# Patient Record
Sex: FEMALE | Race: BLACK OR AFRICAN AMERICAN | Employment: FULL TIME | URBAN - METROPOLITAN AREA
[De-identification: names, ages, dates, MRNs, and addresses within clinical notes are randomized per-mention and may not be internally consistent; named-entity substitution may affect disease eponyms.]

---

## 2017-04-20 ENCOUNTER — TELEPHONE (OUTPATIENT)
Dept: CARDIOLOGY CLINIC | Age: 57
End: 2017-04-20

## 2017-04-20 NOTE — TELEPHONE ENCOUNTER
[facility] for courtesy 4 wk reminder, pt has appointment w/ Dr. Silvestre Bartlett Monday May 15 at 8:30.

## 2017-05-15 ENCOUNTER — OFFICE VISIT (OUTPATIENT)
Dept: CARDIOLOGY CLINIC | Age: 57
End: 2017-05-15

## 2017-05-15 VITALS
WEIGHT: 215 LBS | OXYGEN SATURATION: 98 % | HEIGHT: 66 IN | HEART RATE: 78 BPM | SYSTOLIC BLOOD PRESSURE: 123 MMHG | DIASTOLIC BLOOD PRESSURE: 82 MMHG | BODY MASS INDEX: 34.55 KG/M2

## 2017-05-15 DIAGNOSIS — E78.5 DYSLIPIDEMIA: ICD-10-CM

## 2017-05-15 DIAGNOSIS — R06.09 DOE (DYSPNEA ON EXERTION): ICD-10-CM

## 2017-05-15 DIAGNOSIS — I49.3 PVCS (PREMATURE VENTRICULAR CONTRACTIONS): ICD-10-CM

## 2017-05-15 DIAGNOSIS — R00.2 PALPITATIONS: ICD-10-CM

## 2017-05-15 DIAGNOSIS — I10 ESSENTIAL HYPERTENSION: Chronic | ICD-10-CM

## 2017-05-15 DIAGNOSIS — R06.00 DYSPNEA, UNSPECIFIED TYPE: Primary | ICD-10-CM

## 2017-05-15 DIAGNOSIS — I47.1 SVT (SUPRAVENTRICULAR TACHYCARDIA) (HCC): ICD-10-CM

## 2017-05-15 RX ORDER — BUDESONIDE AND FORMOTEROL FUMARATE DIHYDRATE 160; 4.5 UG/1; UG/1
2 AEROSOL RESPIRATORY (INHALATION) 2 TIMES DAILY
COMMUNITY

## 2017-05-15 NOTE — PROGRESS NOTES
1. Have you been to the ER, urgent care clinic since your last visit? Hospitalized since your last visit? No    2. Have you seen or consulted any other health care providers outside of the [address] [place] [address] since your last visit? Include any pap smears or colon screening.  No

## 2017-05-15 NOTE — MR AVS SNAPSHOT
Visit Information Date & Time Provider Department Dept. Phone Encounter #  
 5/15/2017  8:30 AM Sanjana Tristan MD [facility] [phone] [id number] Follow-up Instructions Return in about 3 weeks (around 6/5/2017). Upcoming Health Maintenance Date Due DTaP/Tdap/Td series (1 - Tdap) 2/22/1979 PAP AKA CERVICAL CYTOLOGY 2/22/1979 FOBT Q 1 YEAR AGE 50-75 10/20/2016 INFLUENZA AGE 9 TO ADULT 8/1/2017 BREAST CANCER SCRN MAMMOGRAM 10/24/2018 Allergies as of 5/15/2017  Review Complete On: 5/15/2017 By: Ulises Maurice RN Severity Noted Reaction Type Reactions Latex  01/25/2014    Unknown (comments) Aspirin  01/25/2014    Unknown (comments) Peanut  02/27/2014    Swelling Current Immunizations  Never Reviewed No immunizations on file. Not reviewed this visit You Were Diagnosed With   
  
 Codes Comments Dyspnea, unspecified type    -  Primary ICD-10-CM: R06.00 
ICD-9-CM: 786.09 Vitals BP Pulse Height(growth percentile) Weight(growth percentile) SpO2 BMI  
 123/82 78 5' 6\" (1.676 m) 215 lb (97.5 kg) 98% 34.7 kg/m2 OB Status Smoking Status Hysterectomy Never Smoker BMI and BSA Data Body Mass Index Body Surface Area 34.7 kg/m 2 2.13 m 2 Preferred Pharmacy Pharmacy Name Phone [facility] [address] [place] [address] 32 Your Updated Medication List  
  
   
This list is accurate as of: 5/15/17  9:00 AM.  Always use your most recent med list. amLODIPine 10 mg tablet Commonly known as:  [place] Take  by mouth daily. calcitRIOL 0.5 mcg capsule Commonly known as:  ROCALTROL Take 1 mcg by mouth two (2) times a day. CO Q-10 100 mg capsule Generic drug:  co-enzyme Q-10 Take 100 mg by mouth daily. LIPITOR 20 mg tablet Generic drug:  atorvastatin Take  by mouth daily. metoprolol succinate 50 mg XL tablet Commonly known as:  TOPROL XL Take 0.5 Tabs by mouth daily. multivitamin tablet Commonly known as:  ONE A DAY Take 1 tablet by mouth daily. SYMBICORT 160-4.5 mcg/actuation HFA inhaler Generic drug:  budesonide-formoterol Take 2 Puffs by inhalation two (2) times a day. SYNTHROID 200 mcg tablet Generic drug:  levothyroxine Take  by mouth Daily (before breakfast). Follow-up Instructions Return in about 3 weeks (around 6/5/2017). Introducing [facility] SERVICES! Shun Moran introduces [employer] patient portal. Now you can access parts of your medical record, email your doctor's office, and request medication refills online. 1. In your internet browser, go to [email or web address] 2. Click on the First Time User? Click Here link in the Sign In box. You will see the New Member Sign Up page. 3. Enter your [employer] Access Code exactly as it appears below. You will not need to use this code after youve completed the sign-up process. If you do not sign up before the expiration date, you must request a new code. · [employer] Access Code: [email or web address] Expires: 8/13/2017  9:00 AM 
 
4. Enter the last four digits of your Social Security Number (xxxx) and Date of Birth (mm/dd/yyyy) as indicated and click Submit. You will be taken to the next sign-up page. 5. Create a [employer] ID. This will be your [employer] login ID and cannot be changed, so think of one that is secure and easy to remember. 6. Create a [employer] password. You can change your password at any time. 7. Enter your Password Reset Question and Answer. This can be used at a later time if you forget your password. 8. Enter your e-mail address. You will receive e-mail notification when new information is available in [address]. 9. Click Sign Up. You can now view and download portions of your medical record. 10. Click the Download Summary menu link to download a portable copy of your medical information. If you have questions, please visit the Frequently Asked Questions section of the [employer] website. Remember, [employer] is NOT to be used for urgent needs. For medical emergencies, dial 911. Now available from your iPhone and Android! Please provide this summary of care documentation to your next provider. Your primary care clinician is listed as Mukund Bhakta. If you have any questions after today's visit, please call [phone].

## 2017-05-15 NOTE — LETTER
Patient:  Jannetta Fabry YOB: 1960 Date of Visit: 5/15/2017 Dear Genny Gabriel MD 
[address] [address] VIA In Basket 
 : Thank you for referring Ms. Jannetta Fabry to me for evaluation/treatment. Below are the relevant portions of my assessment and plan of care. Subjective:  
   Jannetta Fabry is in the office today for cardiac reevaluation. She is a 64-year-old woman that has a history of hypertension, palpitations and SVT. In mid June of 2016, she was complaining of having occasional palpitations that were not particularly bothersome. She was started on atenolol at some point, but her medications were adjusted at the [facility] and her betablocker was left off. When she was initially seen on 10/03/2016, she was having more palpitations. She could feel them in her throat. She said that when her palpitations were the strongest, there was some associated lightheadedness. She was started on Toprol-XL 50 mg per day. With that, she was much less symptomatic. The patient had a nuclear stress test done recently that showed no evidence of ischemia. In the office today, she says she is still having occasional palpitations, but she does not normally notice them until the end of the day. Recently, she has had an episode of bronchitis and is on Symbicort. She states that she has been experiencing possibly more dyspnea on exertion. She generally has limiting dyspnea with walking up one flight of steps. She has had no PND or orthopnea. She has had no near syncope or syncope. .   
 
 
Patient Active Problem List  
 Diagnosis Date Noted  FERNANDES (dyspnea on exertion) 05/22/2017  Palpitations 11/20/2016  BMI 35.0-35.9,adult 10/20/2015  Vitamin D deficiency 10/20/2015  Hypocalcemia 10/20/2015  Postoperative hypothyroidism 10/20/2015  Allergic conjunctivitis of both eyes 05/11/2015  PVCs (premature ventricular contractions)  Dyslipidemia  S/P thyroidectomy 2014  S/P [facility]roidectomy ([facility] 2014  SVT (supraventricular tachycardia) ([facility] 2014  
 HTN (hypertension) 2014  Pain 2014 Current Outpatient Prescriptions Medication Sig Dispense Refill  budesonide-formoterol (SYMBICORT) 160-4.5 mcg/actuation HFA inhaler Take 2 Puffs by inhalation two (2) times a day.  calcitRIOL (ROCALTROL) 0.5 mcg capsule Take 1 mcg by mouth two (2) times a day.  levothyroxine (SYNTHROID) 200 mcg tablet Take  by mouth Daily (before breakfast).  co-enzyme Q-10 (CO Q-10) 100 mg capsule Take 100 mg by mouth daily.  amLODIPine (NORVASC) 10 mg tablet Take  by mouth daily.  atorvastatin (LIPITOR) 20 mg tablet Take  by mouth daily.  metoprolol succinate (TOPROL XL) 50 mg XL tablet Take 0.5 Tabs by mouth daily. 30 Tab 5  
 multivitamin (ONE A DAY) tablet Take 1 tablet by mouth daily. Allergies Allergen Reactions  Latex Unknown (comments)  Aspirin Unknown (comments)  Peanut Swelling Past Medical History:  
Diagnosis Date  Dry eye syndrome  Dyslipidemia  H/O hyperthyroidism   
 s/p thyroidectomy, Dr. Chichi Tran is managing  History of nuclear stress test 14 EF 72-73%,low risk, no wall motion or valvular abnormalities, normal EKG  Hypertension  PVCs (premature ventricular contractions) 2% of all beats, no VT (Holter 2014)  SVT (supraventricular tachycardia) ([facility]) Dr. Itz Pruitt, Cardiology Past Surgical History:  
Procedure Laterality Date [facility]  HX MOHS PROCEDURES  2009  
 right  HX THYROIDECTOMY   Dr. Walker Ray Family History Problem Relation Age of Onset  Hypertension Mother  Stroke Mother 72  
   History Smoking Status  Never Smoker Smokeless Tobacco  
 Never Used Review of Systems, additional: Constitutional: negative Eyes: negative Respiratory: recent episode of bronchitis Cardiovascular: positive for palpitations and FERNANDES Gastrointestinal: negative Musculoskeletal:negative Neurological: negative Behvioral/Psych: negative Endocrine: negative ENT: negative Objective:  
 
Visit Vitals  /82  Pulse 78  Ht 5' 6\" (1.676 m)  Wt 215 lb (97.5 kg)  SpO2 98%  BMI 34.7 kg/m2 General:  alert, cooperative, no distress, appears stated age Chest Wall: inspection normal - no chest wall deformities or tenderness, respiratory effort normal  
Lung: clear to auscultation bilaterally Heart:  normal rate, regular rhythm, normal S1, S2, no murmurs, rubs, clicks or gallops Abdomen: soft, non-tender. Bowel sounds normal. No masses,  no organomegaly Extremities: extremities normal, atraumatic, no cyanosis or edema Skin: no rashes Neuro: alert, oriented, normal speech, no focal findings or movement disorder noted Assessment/Plan: ICD-10-CM ICD-9-CM 1. Dyspnea, unspecified type R06.00 786.09 2D ECHO COMPLETE ADULT (TTE) W OR WO CONTR 2. Essential hypertension, controlled in office today. I10 401.9 3. SVT (supraventricular tachycardia) (HCC) I47.1 427.89 4. PVCs (premature ventricular contractions) I49.3 427.69   
5. Dyslipidemia E78.5 272.4 6. Palpitations, improved with addition of BB R00.2 785.1 7. FERNANDES (dyspnea on exertion), will order Echocardiogram to assess for diastolic dysfunction and consideration for low dose diuretics. RT 4 weeks. R06.09 786.09 If you have questions, please do not hesitate to call me. I look forward to following MsNirmala Chele Emelina along with you. Sincerely, Patricia Smith MD

## 2017-05-22 PROBLEM — R06.09 DOE (DYSPNEA ON EXERTION): Status: ACTIVE | Noted: 2017-05-22

## 2017-05-22 NOTE — PROGRESS NOTES
Subjective:      Radha Valenzuela is in the office today for cardiac reevaluation. She is a 66-year-old woman that has a history of hypertension, palpitations and SVT. In mid June of 2016, she was complaining of having occasional palpitations that were not particularly bothersome. She was started on atenolol at some point, but her medications were adjusted at the [facility] and her betablocker was left off. When she was initially seen on 10/03/2016, she was having more palpitations. She could feel them in her throat. She said that when her palpitations were the strongest, there was some associated lightheadedness. She was started on Toprol-XL 50 mg per day. With that, she was much less symptomatic. The patient had a nuclear stress test done recently that showed no evidence of ischemia. In the office today, she says she is still having occasional palpitations, but she does not normally notice them until the end of the day. Recently, she has had an episode of bronchitis and is on Symbicort. She states that she has been experiencing possibly more dyspnea on exertion. She generally has limiting dyspnea with walking up one flight of steps. She has had no PND or orthopnea. She has had no near syncope or syncope.                       .        Patient Active Problem List    Diagnosis Date Noted    FERNANDES (dyspnea on exertion) 05/22/2017    Palpitations 11/20/2016    BMI 35.0-35.9,adult 10/20/2015    Vitamin D deficiency 10/20/2015    Hypocalcemia 10/20/2015    Postoperative hypothyroidism 10/20/2015    Allergic conjunctivitis of both eyes 05/11/2015    PVCs (premature ventricular contractions)     Dyslipidemia     S/P thyroidectomy 03/27/2014    [facility] 03/27/2014    SVT (supraventricular tachycardia) ([facility] 03/27/2014    HTN (hypertension) 02/27/2014    Pain 02/27/2014     Current Outpatient Prescriptions   Medication Sig Dispense Refill    budesonide-formoterol (SYMBICORT) 160-4.5 mcg/actuation HFA inhaler Take 2 Puffs by inhalation two (2) times a day.  calcitRIOL (ROCALTROL) 0.5 mcg capsule Take 1 mcg by mouth two (2) times a day.  levothyroxine (SYNTHROID) 200 mcg tablet Take  by mouth Daily (before breakfast).  co-enzyme Q-10 (CO Q-10) 100 mg capsule Take 100 mg by mouth daily.  amLODIPine (NORVASC) 10 mg tablet Take  by mouth daily.  atorvastatin (LIPITOR) 20 mg tablet Take  by mouth daily.  metoprolol succinate (TOPROL XL) 50 mg XL tablet Take 0.5 Tabs by mouth daily. 30 Tab 5    multivitamin (ONE A DAY) tablet Take 1 tablet by mouth daily.        Allergies   Allergen Reactions    Latex Unknown (comments)    Aspirin Unknown (comments)    Peanut Swelling     Past Medical History:   Diagnosis Date    Dry eye syndrome     Dyslipidemia     H/O hyperthyroidism     s/p thyroidectomy, Dr. Amara Rubi is managing    History of nuclear stress test 14    EF 72-73%,low risk, no wall motion or valvular abnormalities, normal EKG    Hypertension     PVCs (premature ventricular contractions)     2% of all beats, no VT (Holter 2014)    SVT [facility]supraventricular tachycardia) ([facility])     Dr. Colon Medicus, Cardiology     Past Surgical History:   Procedure Laterality Date    HX HYSTERECTOMY      HX MOHS PROCEDURES  2009    right    [facility]      Dr. Russell Braxton     Family History   Problem Relation Age of Onset    Hypertension Mother     Stroke Mother 72          History   Smoking Status    Never Smoker   Smokeless Tobacco    Never Used          Review of Systems, additional:  Constitutional: negative  Eyes: negative  Respiratory: recent episode of bronchitis  Cardiovascular: positive for palpitations and FERNANDES  Gastrointestinal: negative  Musculoskeletal:negative  Neurological: negative  Behvioral/Psych: negative  Endocrine: negative  ENT: negative    Objective:     Visit Vitals    /82    Pulse 78    Ht 5' 6\" (1.676 m)    Wt 215 lb (97.5 kg)    SpO2 98%    BMI 34.7 kg/m2     General:  alert, cooperative, no distress, appears stated age   Chest Wall: inspection normal - no chest wall deformities or tenderness, respiratory effort normal   Lung: clear to auscultation bilaterally   Heart:  normal rate, regular rhythm, normal S1, S2, no murmurs, rubs, clicks or gallops   Abdomen: soft, non-tender. Bowel sounds normal. No masses,  no organomegaly   Extremities: extremities normal, atraumatic, no cyanosis or edema Skin: no rashes   Neuro: alert, oriented, normal speech, no focal findings or movement disorder noted         Assessment/Plan:       ICD-10-CM ICD-9-CM    1. Dyspnea, unspecified type R06.00 786.09 2D ECHO COMPLETE ADULT (TTE) W OR WO CONTR   2. Essential hypertension, controlled in office today. I10 401.9    3. SVT (supraventricular tachycardia) (HCC) I47.1 427.89    4. PVCs (premature ventricular contractions) I49.3 427.69    5. Dyslipidemia E78.5 272.4    6. Palpitations, improved with addition of BB R00.2 785.1    7. FERNANDES (dyspnea on exertion), will order Echocardiogram to assess for diastolic dysfunction and consideration for low dose diuretics. RT 4 weeks.  R06.09 786.09

## 2017-05-27 ENCOUNTER — HOSPITAL ENCOUNTER (OUTPATIENT)
Dept: NON INVASIVE DIAGNOSTICS | Age: 57
Discharge: HOME | End: 2017-05-27
Attending: INTERNAL MEDICINE
Payer: COMMERCIAL

## 2017-05-27 DIAGNOSIS — R06.00 DYSPNEA, UNSPECIFIED TYPE: ICD-10-CM

## 2017-05-27 PROCEDURE — 93306 TTE W/DOPPLER COMPLETE: CPT

## 2017-06-05 ENCOUNTER — OFFICE VISIT (OUTPATIENT)
Dept: CARDIOLOGY CLINIC | Age: 57
End: 2017-06-05

## 2017-06-05 VITALS
HEART RATE: 89 BPM | HEIGHT: 66 IN | OXYGEN SATURATION: 98 % | BODY MASS INDEX: 34.55 KG/M2 | WEIGHT: 215 LBS | DIASTOLIC BLOOD PRESSURE: 79 MMHG | SYSTOLIC BLOOD PRESSURE: 120 MMHG

## 2017-06-05 DIAGNOSIS — I49.3 PVCS (PREMATURE VENTRICULAR CONTRACTIONS): ICD-10-CM

## 2017-06-05 DIAGNOSIS — R06.09 DOE (DYSPNEA ON EXERTION): ICD-10-CM

## 2017-06-05 DIAGNOSIS — I10 ESSENTIAL HYPERTENSION: Primary | Chronic | ICD-10-CM

## 2017-06-05 DIAGNOSIS — R00.2 PALPITATIONS: ICD-10-CM

## 2017-06-05 DIAGNOSIS — I47.1 SVT (SUPRAVENTRICULAR TACHYCARDIA) (HCC): ICD-10-CM

## 2017-06-05 NOTE — LETTER
Patient:  Homa Loera YOB: 1960 Date of Visit: 6/5/2017 Dear Jd Lacy MD 
[address] [place] [place] [address] VIA In Basket 
 : Thank you for referring Ms. Homa Loera to me for evaluation/treatment. Below are the relevant portions of my assessment and plan of care. Subjective:  
   Homa Loera is in the office today for cardiac reevaluation. She is a 61-year-old woman that has a history of hypertension, palpitations and SVT. She was complaining of palpitations when she was initially seen in October of 2016. She was started on Toprol-XL 50 mg. She became much less symptomatic with the start of a betablocker. She also had some chest discomfort and a nuclear stress test was done that showed no evidence of ischemia. On her most recent visit of May 15th, she was complaining of increasing dyspnea on exertion. She said she would generally experience it with such activities as walking up a flight of steps. She felt this was worse than in the past.  An echocardiogram was ordered and done on May 27th. She had normal systolic function with no significant valvular pathology. She had no evidence of diastolic dysfunction. She also had no evidence of pulmonary hypertension. In the office today, she says she still has limiting dyspnea. She can feel her heart beating fast when she does such activities as Saqib. She relates that she may be somewhat out of shape.  Patient Active Problem List  
 Diagnosis Date Noted  FERNANDES (dyspnea on exertion) 05/22/2017  Palpitations 11/20/2016  BMI 35.0-35.9,adult 10/20/2015  Vitamin D deficiency 10/20/2015  Hypocalcemia 10/20/2015  Postoperative hypothyroidism 10/20/2015  Allergic conjunctivitis of both eyes 05/11/2015  PVCs (premature ventricular contractions)  Dyslipidemia  S/P thyroidectomy 03/27/2014  S/P parathyroidectomy ([facility] 03/27/2014  SVT (supraventricular tachycardia) ([facility] 2014  
 HTN (hypertension) 2014  Pain 2014 Current Outpatient Prescriptions Medication Sig Dispense Refill  budesonide-formoterol (SYMBICORT) 160-4.5 mcg/actuation HFA inhaler Take 2 Puffs by inhalation two (2) times a day.  calcitRIOL (ROCALTROL) 0.5 mcg capsule Take 1 mcg by mouth two (2) times a day.  levothyroxine (SYNTHROID) 200 mcg tablet Take  by mouth Daily (before breakfast).  co-enzyme Q-10 (CO Q-10) 100 mg capsule Take 100 mg by mouth daily.  amLODIPine (NORVASC) 10 mg tablet Take  by mouth daily.  atorvastatin (LIPITOR) 20 mg tablet Take  by mouth daily.  metoprolol succinate (TOPROL XL) 50 mg XL tablet Take 0.5 Tabs by mouth daily. 30 Tab 5  
 multivitamin (ONE A DAY) tablet Take 1 tablet by mouth daily. Allergies Allergen Reactions  Latex Unknown (comments)  Aspirin Unknown (comments)  Peanut Swelling Past Medical History:  
Diagnosis Date  Dry eye syndrome  Dyslipidemia  H/O hyperthyroidism   
 s/p thyroidectomy, Dr. Kiran Ayala is managing  History of nuclear stress test 14 EF 72-73%,low risk, no wall motion or valvular abnormalities, normal EKG  Hypertension  PVCs (premature ventricular contractions) 2% of all beats, no VT (Holter 2014)  SVT (supraventricular tachycardia) ([facility]) Dr. Yana Rose, Cardiology Past Surgical History:  
Procedure Laterality Date [facility]  HX MOHS PROCEDURES  2009  
 right  HX THYROIDECTOMY   Dr. Leonie Hernandez Family History Problem Relation Age of Onset  Hypertension Mother  Stroke Mother 72  
   History Smoking Status  Never Smoker Smokeless Tobacco  
 Never Used Review of Systems, additional: 
Constitutional: negative Eyes: negative Respiratory: negative Cardiovascular: negative Gastrointestinal: negative Musculoskeletal:negative Neurological: negative Behvioral/Psych: negative Endocrine: negative ENT: negative Objective:  
 
Visit Vitals  /79  Pulse 89  
 Ht 5' 6\" (1.676 m)  Wt 215 lb (97.5 kg)  SpO2 98%  BMI 34.7 kg/m2 General:  alert, cooperative, no distress, appears stated age Chest Wall: inspection normal - no chest wall deformities or tenderness, respiratory effort normal  
Lung: clear to auscultation bilaterally Heart:  normal rate, regular rhythm, normal S1, S2, no murmurs, rubs, clicks or gallops Abdomen: soft, non-tender. Bowel sounds normal. No masses,  no organomegaly Extremities: extremities normal, atraumatic, no cyanosis or edema Skin: no rashes Neuro: alert, oriented, normal speech, no focal findings or movement disorder noted Assessment/Plan: ICD-10-CM ICD-9-CM 1. Essential hypertension controlled I10 401.9 2. PVCs (premature ventricular contractions) I49.3 427.69   
3. SVT (supraventricular tachycardia) (HCC) I47.1 427.89   
4. FERNANDES (dyspnea on exertion), likely related in part  to cardiac deconditioning. She will increase activities , exercise regularly and RT in 3 months. Recent echo did not indicate the presence of diastolic dysfunction or pulmonary HTN. RT 3 mos. R06.09 786.09   
5. Palpitations R00.2 785.1 If you have questions, please do not hesitate to call me. I look forward to following Ms. Jose Johnson along with you. Sincerely, Alison Squires MD

## 2017-06-05 NOTE — PROGRESS NOTES
1. Have you been to the ER, urgent care clinic since your last visit? Hospitalized since your last visit? No    2. Have you seen or consulted any other health care providers outside of the [address] [place] [address] since your last visit? Include any pap smears or colon screening.  No

## 2017-06-05 NOTE — MR AVS SNAPSHOT
Visit Information Date & Time Provider Department Dept. Phone Encounter #  
 6/5/2017  9:15 AM Mohan Rivera  [facility] Specialist at [facility] [phone] [id number] Follow-up Instructions Return in about 3 months (around 9/5/2017). Upcoming Health Maintenance Date Due DTaP/Tdap/Td series (1 - Tdap) 2/22/1979 PAP AKA CERVICAL CYTOLOGY 2/22/1979 FOBT Q 1 YEAR AGE 50-75 10/20/2016 INFLUENZA AGE 9 TO ADULT 8/1/2017 BREAST CANCER SCRN MAMMOGRAM 10/24/2018 Allergies as of 6/5/2017  Review Complete On: 6/5/2017 By: Michael Leos LPN Severity Noted Reaction Type Reactions Latex  01/25/2014    Unknown (comments) Aspirin  01/25/2014    Unknown (comments) Peanut  02/27/2014    Swelling Current Immunizations  Never Reviewed No immunizations on file. Not reviewed this visit Vitals BP Pulse Height(growth percentile) Weight(growth percentile) SpO2 BMI  
 120/79 89 5' 6\" (1.676 m) 215 lb (97.5 kg) 98% 34.7 kg/m2 OB Status Smoking Status Hysterectomy Never Smoker BMI and BSA Data Body Mass Index Body Surface Area 34.7 kg/m 2 2.13 m 2 Preferred Pharmacy Pharmacy Name Phone [facility] [address] [place], [address] Your Updated Medication List  
  
   
This list is accurate as of: 6/5/17 10:22 AM.  Always use your most recent med list. amLODIPine 10 mg tablet Commonly known as:  Leena Medici Take  by mouth daily. calcitRIOL 0.5 mcg capsule Commonly known as:  ROCALTROL Take 1 mcg by mouth two (2) times a day. CO Q-10 100 mg capsule Generic drug:  co-enzyme Q-10 Take 100 mg by mouth daily. LIPITOR 20 mg tablet Generic drug:  atorvastatin Take  by mouth daily. metoprolol succinate 50 mg XL tablet Commonly known as:  TOPROL XL Take 0.5 Tabs by mouth daily. multivitamin tablet Commonly known as:  ONE A DAY Take 1 tablet by mouth daily. SYMBICORT 160-4.5 mcg/actuation HFA inhaler Generic drug:  budesonide-formoterol Take 2 Puffs by inhalation two (2) times a day. SYNTHROID 200 mcg tablet Generic drug:  levothyroxine Take  by mouth Daily (before breakfast). Follow-up Instructions Return in about 3 months (around 9/5/2017). Introducing [facility]! Tyler Torres introduces [email or web address] patient portal. Now you can access parts of your medical record, email your doctor's office, and request medication refills online. 1. In your internet browser, go to [email or web address] 2. Click on the First Time User? Click Here link in the Sign In box. You will see the New Member Sign Up page. 3. Enter your Daily[employer] Access Code exactly as it appears below. You will not need to use this code after youve completed the sign-up process. If you do not sign up before the expiration date, you must request a new code. · [email or web address] Access Code: [email or web address] Expires: 8/13/2017  9:00 AM 
 
4. Enter the last four digits of your Social Security Number (xxxx) and Date of Birth (mm/dd/yyyy) as indicated and click Submit. You will be taken to the next sign-up page. 5. Create a DailyObjects.[email or web address] ID. This will be your [email or web address] login ID and cannot be changed, so think of one that is secure and easy to remember. 6. Create a DailyObjects[employer]com password. You can change your password at any time. 7. Enter your Password Reset Question and Answer. This can be used at a later time if you forget your password. 8. Enter your e-mail address. You will receive e-mail notification when new information is available in [address] 9. Click Sign Up. You can now view and download portions of your medical record. 10. Click the Download Summary menu link to download a portable copy of your medical information. If you have questions, please visit the Frequently Asked Questions section of the Tap[employer] website. Remember, ERUCES is NOT to be used for urgent needs. For medical emergencies, dial 911. Now available from your iPhone and Android! Please provide this summary of care documentation to your next provider. Your primary care clinician is listed as Minna Gordon. If you have any questions after today's visit, please call [phone].

## 2017-06-15 NOTE — PROGRESS NOTES
Subjective:      Hang Pedraza is in the office today for cardiac reevaluation. She is a 39-year-old woman that has a history of hypertension, palpitations and SVT. She was complaining of palpitations when she was initially seen in October of 2016. She was started on Toprol-XL 50 mg. She became much less symptomatic with the start of a betablocker. She also had some chest discomfort and a nuclear stress test was done that showed no evidence of ischemia. On her most recent visit of May 15th, she was complaining of increasing dyspnea on exertion. She said she would generally experience it with such activities as walking up a flight of steps. She felt this was worse than in the past.  An echocardiogram was ordered and done on May 27th. She had normal systolic function with no significant valvular pathology. She had no evidence of diastolic dysfunction. She also had no evidence of pulmonary hypertension. In the office today, she says she still has limiting dyspnea. She can feel her heart beating fast when she does such activities as Saqib. She relates that she may be somewhat out of shape.                        Patient Active Problem List    Diagnosis Date Noted    FERNANDES (dyspnea on exertion) 05/22/2017    Palpitations 11/20/2016    BMI 35.0-35.9,adult 10/20/2015    Vitamin D deficiency 10/20/2015    Hypocalcemia 10/20/2015    Postoperative hypothyroidism 10/20/2015    Allergic conjunctivitis of both eyes 05/11/2015    PVCs (premature ventricular contractions)     Dyslipidemia     S/P thyroidectomy 03/27/2014    S/P parathyroidectomy ([facility] [address] 03/27/2014    SVT (supraventricular tachycardia) ([facility] [address] 03/27/2014    HTN (hypertension) 02/27/2014    Pain 02/27/2014     Current Outpatient Prescriptions   Medication Sig Dispense Refill    budesonide-formoterol (SYMBICORT) 160-4.5 mcg/actuation HFA inhaler Take 2 Puffs by inhalation two (2) times a day.       calcitRIOL (ROCALTROL) 0.5 mcg capsule Take 1 mcg by mouth two (2) times a day.  levothyroxine (SYNTHROID) 200 mcg tablet Take  by mouth Daily (before breakfast).  co-enzyme Q-10 (CO Q-10) 100 mg capsule Take 100 mg by mouth daily.  amLODIPine (NORVASC) 10 mg tablet Take  by mouth daily.  atorvastatin (LIPITOR) 20 mg tablet Take  by mouth daily.  metoprolol succinate (TOPROL XL) 50 mg XL tablet Take 0.5 Tabs by mouth daily. 30 Tab 5    multivitamin (ONE A DAY) tablet Take 1 tablet by mouth daily.        Allergies   Allergen Reactions    Latex Unknown (comments)    Aspirin Unknown (comments)    Peanut Swelling     Past Medical History:   Diagnosis Date    Dry eye syndrome     Dyslipidemia     H/O hyperthyroidism     s/p thyroidectomy, Dr. Volodymyr Hawk is managing    History of nuclear stress test 14    EF 72-73%,low risk, no wall motion or valvular abnormalities, normal EKG    Hypertension     PVCs (premature ventricular contractions)     2% of all beats, no VT (Holter 2014)    SV[facility]) ([facility]     Dr. Parul Mckeon, Cardiology     Past Surgical History:   Procedure Laterality Date    HX HYSTERECTOMY      HX MOHS PROCEDURES  2009    right    [facility]      Dr. Carlito Montoya     Family History   Problem Relation Age of Onset    Hypertension Mother     Stroke Mother 72          History   Smoking Status    Never Smoker   Smokeless Tobacco    Never Used          Review of Systems, additional:  Constitutional: negative  Eyes: negative  Respiratory: negative  Cardiovascular: negative  Gastrointestinal: negative  Musculoskeletal:negative  Neurological: negative  Behvioral/Psych: negative  Endocrine: negative  ENT: negative    Objective:     Visit Vitals    /79    Pulse 89    Ht 5' 6\" (1.676 m)    Wt 215 lb (97.5 kg)    SpO2 98%    BMI 34.7 kg/m2     General:  alert, cooperative, no distress, appears stated age   Chest Wall: inspection normal - no chest wall deformities or tenderness, respiratory effort normal   Lung: clear to auscultation bilaterally   Heart:  normal rate, regular rhythm, normal S1, S2, no murmurs, rubs, clicks or gallops   Abdomen: soft, non-tender. Bowel sounds normal. No masses,  no organomegaly   Extremities: extremities normal, atraumatic, no cyanosis or edema Skin: no rashes   Neuro: alert, oriented, normal speech, no focal findings or movement disorder noted         Assessment/Plan:       ICD-10-CM ICD-9-CM    1. Essential hypertension controlled I10 401.9    2. PVCs (premature ventricular contractions) I49.3 427.69    3. SVT (supraventricular tachycardia) (HCC) I47.1 427.89    4. FERNANDES (dyspnea on exertion), likely related in part  to cardiac deconditioning. She will increase activities , exercise regularly and RT in 3 months. Recent echo did not indicate the presence of diastolic dysfunction or pulmonary HTN. RT 3 mos. R06.09 786.09    5.  Palpitations R00.2 785.1

## 2017-06-16 NOTE — COMMUNICATION BODY
Subjective:      Shay Herron is in the office today for cardiac reevaluation. She is a 64-year-old woman that has a history of hypertension, palpitations and SVT. In mid June of 2016, she was complaining of having occasional palpitations that were not particularly bothersome. She was started on atenolol at some point, but her medications were adjusted at the [facility] and her betablocker was left off. When she was initially seen on 10/03/2016, she was having more palpitations. She could feel them in her throat. She said that when her palpitations were the strongest, there was some associated lightheadedness. She was started on Toprol-XL 50 mg per day. With that, she was much less symptomatic. The patient had a nuclear stress test done recently that showed no evidence of ischemia. In the office today, she says she is still having occasional palpitations, but she does not normally notice them until the end of the day. Recently, she has had an episode of bronchitis and is on Symbicort. She states that she has been experiencing possibly more dyspnea on exertion. She generally has limiting dyspnea with walking up one flight of steps. She has had no PND or orthopnea. She has had no near syncope or syncope.                       .        Patient Active Problem List    Diagnosis Date Noted    FERNANDES (dyspnea on exertion) 05/22/2017    Palpitations 11/20/2016    BMI 35.0-35.9,adult 10/20/2015    Vitamin D deficiency 10/20/2015    Hypocalcemia 10/20/2015    Postoperative hypothyroidism 10/20/2015    Allergic conjunctivitis of both eyes 05/11/2015    PVCs (premature ventricular contractions)     Dyslipidemia     S/P thyroidectomy 03/27/2014    S/P parathyroidectomy ([address] 03/27/2014    SVT (supraventricular tachycardia) ([facility] [address] 03/27/2014    HTN (hypertension) 02/27/2014    Pain 02/27/2014     Current Outpatient Prescriptions   Medication Sig Dispense Refill    budesonide-formoterol (SYMBICORT) 160-4.5 mcg/actuation HFA inhaler Take 2 Puffs by inhalation two (2) times a day.  calcitRIOL (ROCALTROL) 0.5 mcg capsule Take 1 mcg by mouth two (2) times a day.  levothyroxine (SYNTHROID) 200 mcg tablet Take  by mouth Daily (before breakfast).  co-enzyme Q-10 (CO Q-10) 100 mg capsule Take 100 mg by mouth daily.  amLODIPine (NORVASC) 10 mg tablet Take  by mouth daily.  atorvastatin (LIPITOR) 20 mg tablet Take  by mouth daily.  metoprolol succinate (TOPROL XL) 50 mg XL tablet Take 0.5 Tabs by mouth daily. 30 Tab 5    multivitamin (ONE A DAY) tablet Take 1 tablet by mouth daily.        Allergies   Allergen Reactions    Latex Unknown (comments)    Aspirin Unknown (comments)    Peanut Swelling     Past Medical History:   Diagnosis Date    Dry eye syndrome     Dyslipidemia     H/O hyperthyroidism     s/p thyroidectomy, Dr. Davis Her is managing    History of nuclear stress test 14    EF 72-73%,low risk, no wall motion or valvular abnormalities, normal EKG    Hypertension     PVCs (premature ventricular contractions)     2% of all beats, no VT (Holter 2014)    SVT [facility]ricular [facility]) ([facility])     Dr. Sagrario Shah, Cardiology     Past Surgical History:   Procedure Laterality Date    HX HYSTERECTOMY      HX MOHS PROCEDURES  2009    right    [facility]      Dr. Bessie Roger     Family History   Problem Relation Age of Onset    Hypertension Mother     Stroke Mother 72          History   Smoking Status    Never Smoker   Smokeless Tobacco    Never Used          Review of Systems, additional:  Constitutional: negative  Eyes: negative  Respiratory: recent episode of bronchitis  Cardiovascular: positive for palpitations and FERNANDES  Gastrointestinal: negative  Musculoskeletal:negative  Neurological: negative  Behvioral/Psych: negative  Endocrine: negative  ENT: negative    Objective:     Visit Vitals    /82    Pulse 78    Ht 5' 6\" (1.676 m)    Wt 215 lb (97.5 kg)    SpO2 98%    BMI 34.7 kg/m2     General:  alert, cooperative, no distress, appears stated age   Chest Wall: inspection normal - no chest wall deformities or tenderness, respiratory effort normal   Lung: clear to auscultation bilaterally   Heart:  normal rate, regular rhythm, normal S1, S2, no murmurs, rubs, clicks or gallops   Abdomen: soft, non-tender. Bowel sounds normal. No masses,  no organomegaly   Extremities: extremities normal, atraumatic, no cyanosis or edema Skin: no rashes   Neuro: alert, oriented, normal speech, no focal findings or movement disorder noted         Assessment/Plan:       ICD-10-CM ICD-9-CM    1. Dyspnea, unspecified type R06.00 786.09 2D ECHO COMPLETE ADULT (TTE) W OR WO CONTR   2. Essential hypertension, controlled in office today. I10 401.9    3. SVT (supraventricular tachycardia) (HCC) I47.1 427.89    4. PVCs (premature ventricular contractions) I49.3 427.69    5. Dyslipidemia E78.5 272.4    6. Palpitations, improved with addition of BB R00.2 785.1    7. FERNANDES (dyspnea on exertion), will order Echocardiogram to assess for diastolic dysfunction and consideration for low dose diuretics. RT 4 weeks.  R06.09 786.09

## 2017-07-21 NOTE — COMMUNICATION BODY
Subjective:      Camille Cortez is in the office today for cardiac reevaluation. She is a 57-year-old woman that has a history of hypertension, palpitations and SVT. She was complaining of palpitations when she was initially seen in October of 2016. She was started on Toprol-XL 50 mg. She became much less symptomatic with the start of a betablocker. She also had some chest discomfort and a nuclear stress test was done that showed no evidence of ischemia. On her most recent visit of May 15th, she was complaining of increasing dyspnea on exertion. She said she would generally experience it with such activities as walking up a flight of steps. She felt this was worse than in the past.  An echocardiogram was ordered and done on May 27th. She had normal systolic function with no significant valvular pathology. She had no evidence of diastolic dysfunction. She also had no evidence of pulmonary hypertension. In the office today, she says she still has limiting dyspnea. She can feel her heart beating fast when she does such activities as Saqib. She relates that she may be somewhat out of shape.                        Patient Active Problem List    Diagnosis Date Noted    FERNANDES (dyspnea on exertion) 05/22/2017    Palpitations 11/20/2016    BMI 35.0-35.9,adult 10/20/2015    Vitamin D deficiency 10/20/2015    Hypocalcemia 10/20/2015    Postoperative hypothyroidism 10/20/2015    Allergic conjunctivitis of both eyes 05/11/2015    PVCs (premature ventricular contractions)     Dyslipidemia     S/P thyroidectomy 03/27/2014    S/P parathyroidectomy ([facility] [address] 03/27/2014    SVT (supraventricular tachycardia) ([facility] [address] 03/27/2014    HTN (hypertension) 02/27/2014    Pain 02/27/2014     Current Outpatient Prescriptions   Medication Sig Dispense Refill    budesonide-formoterol (SYMBICORT) 160-4.5 mcg/actuation HFA inhaler Take 2 Puffs by inhalation two (2) times a day.       calcitRIOL (ROCALTROL) 0.5 mcg capsule Take 1 mcg by mouth two (2) times a day.  levothyroxine (SYNTHROID) 200 mcg tablet Take  by mouth Daily (before breakfast).  co-enzyme Q-10 (CO Q-10) 100 mg capsule Take 100 mg by mouth daily.  amLODIPine (NORVASC) 10 mg tablet Take  by mouth daily.  atorvastatin (LIPITOR) 20 mg tablet Take  by mouth daily.  metoprolol succinate (TOPROL XL) 50 mg XL tablet Take 0.5 Tabs by mouth daily. 30 Tab 5    multivitamin (ONE A DAY) tablet Take 1 tablet by mouth daily.        Allergies   Allergen Reactions    Latex Unknown (comments)    Aspirin Unknown (comments)    Peanut Swelling     Past Medical History:   Diagnosis Date    Dry eye syndrome     Dyslipidemia     H/O hyperthyroidism     s/p thyroidectomy, Dr. Eleni Hercules is managing    History of nuclear stress test 14    EF 72-73%,low risk, no wall motion or valvular abnormalities, normal EKG    Hypertension     PVCs (premature ventricular contractions)     2% of all beats, no VT (Holter 2014)    SV[facility] ([facility]     Dr. Destini Vincent, Cardiology     Past Surgical History:   Procedure Laterality Date    HX HYSTERECTOMY      HX MOHS PROCEDURES  2009    right    [facility]      Dr. Ha Certain     Family History   Problem Relation Age of Onset    Hypertension Mother     Stroke Mother 72          History   Smoking Status    Never Smoker   Smokeless Tobacco    Never Used          Review of Systems, additional:  Constitutional: negative  Eyes: negative  Respiratory: negative  Cardiovascular: negative  Gastrointestinal: negative  Musculoskeletal:negative  Neurological: negative  Behvioral/Psych: negative  Endocrine: negative  ENT: negative    Objective:     Visit Vitals    /79    Pulse 89    Ht 5' 6\" (1.676 m)    Wt 215 lb (97.5 kg)    SpO2 98%    BMI 34.7 kg/m2     General:  alert, cooperative, no distress, appears stated age   Chest Wall: inspection normal - no chest wall deformities or tenderness, respiratory effort normal   Lung: clear to auscultation bilaterally   Heart:  normal rate, regular rhythm, normal S1, S2, no murmurs, rubs, clicks or gallops   Abdomen: soft, non-tender. Bowel sounds normal. No masses,  no organomegaly   Extremities: extremities normal, atraumatic, no cyanosis or edema Skin: no rashes   Neuro: alert, oriented, normal speech, no focal findings or movement disorder noted         Assessment/Plan:       ICD-10-CM ICD-9-CM    1. Essential hypertension controlled I10 401.9    2. PVCs (premature ventricular contractions) I49.3 427.69    3. SVT (supraventricular tachycardia) (HCC) I47.1 427.89    4. FERNANDES (dyspnea on exertion), likely related in part  to cardiac deconditioning. She will increase activities , exercise regularly and RT in 3 months. Recent echo did not indicate the presence of diastolic dysfunction or pulmonary HTN. RT 3 mos. R06.09 786.09    5.  Palpitations R00.2 785.1

## 2017-10-18 ENCOUNTER — OFFICE VISIT (OUTPATIENT)
Dept: CARDIOLOGY CLINIC | Age: 57
End: 2017-10-18

## 2017-10-18 VITALS
WEIGHT: 219 LBS | DIASTOLIC BLOOD PRESSURE: 81 MMHG | SYSTOLIC BLOOD PRESSURE: 126 MMHG | OXYGEN SATURATION: 98 % | HEIGHT: 66 IN | HEART RATE: 72 BPM | BODY MASS INDEX: 35.2 KG/M2

## 2017-10-18 DIAGNOSIS — R00.2 PALPITATIONS: ICD-10-CM

## 2017-10-18 DIAGNOSIS — I47.1 SVT (SUPRAVENTRICULAR TACHYCARDIA) (HCC): ICD-10-CM

## 2017-10-18 DIAGNOSIS — I49.3 PVCS (PREMATURE VENTRICULAR CONTRACTIONS): ICD-10-CM

## 2017-10-18 DIAGNOSIS — R06.09 DOE (DYSPNEA ON EXERTION): ICD-10-CM

## 2017-10-18 DIAGNOSIS — E78.5 DYSLIPIDEMIA: ICD-10-CM

## 2017-10-18 DIAGNOSIS — I10 ESSENTIAL HYPERTENSION: Primary | Chronic | ICD-10-CM

## 2017-10-18 NOTE — PROGRESS NOTES
1. Have you been to the ER, urgent care clinic since your last visit? Hospitalized since your last visit? No    2. Have you seen or consulted any other health care providers outside of the [address] [place] [address] since your last visit? Include any pap smears or colon screening.  No

## 2017-10-18 NOTE — MR AVS SNAPSHOT
Visit Information Date & Time Provider Department Dept. Phone Encounter #  
 10/18/2017  1:30 PM Nena Reza MD [address] [place] [address] Specialist at [place] [address] [id number] Your Appointments 4/25/2018 10:45 AM  
Follow Up with Nena Reza MD  
Cardio Specialist at [facility] Appt Note: 6 months [facility] [address] [facility] [address] [place] [address] [address] Upcoming Health Maintenance Date Due DTaP/Tdap/Td series (1 - Tdap) 2/22/1979 PAP AKA CERVICAL CYTOLOGY 2/22/1979 FOBT Q 1 YEAR AGE 50-75 10/20/2016 INFLUENZA AGE 9 TO ADULT 8/1/2017 BREAST CANCER SCRN MAMMOGRAM 10/24/2018 Allergies as of 10/18/2017  Review Complete On: 10/18/2017 By: Paramjit Gong LPN Severity Noted Reaction Type Reactions Latex  01/25/2014    Unknown (comments) Aspirin  01/25/2014    Unknown (comments) Peanut  02/27/2014    Swelling Current Immunizations  Never Reviewed No immunizations on file. Not reviewed this visit Vitals BP Pulse Height(growth percentile) Weight(growth percentile) SpO2 BMI  
 126/81 72 5' 6\" (1.676 m) 219 lb (99.3 kg) 98% 35.35 kg/m2 OB Status Smoking Status Hysterectomy Never Smoker BMI and BSA Data Body Mass Index Body Surface Area  
 35.35 kg/m 2 2.15 m 2 Preferred Pharmacy Pharmacy Name Phone [facility] [address] Your Updated Medication List  
  
   
This list is accurate as of: 10/18/17  2:07 PM.  Always use your most recent med list. amLODIPine 10 mg tablet Commonly known as:  Susi Blade Take  by mouth daily. calcitRIOL 0.5 mcg capsule Commonly known as:  ROCALTROL Take 1 mcg by mouth two (2) times a day. CO Q-10 100 mg capsule Generic drug:  co-enzyme Q-10  
 Take 100 mg by mouth daily. LIPITOR 20 mg tablet Generic drug:  atorvastatin Take  by mouth daily. metoprolol succinate 50 mg XL tablet Commonly known as:  TOPROL XL Take 0.5 Tabs by mouth daily. multivitamin tablet Commonly known as:  ONE A DAY Take 1 tablet by mouth daily. SYMBICORT 160-4.5 mcg/actuation Hfaa Generic drug:  budesonide-formoterol Take 2 Puffs by inhalation two (2) times a day. SYNTHROID 200 mcg tablet Generic drug:  levothyroxine Take  by mouth Daily (before breakfast). Introducing [facility]! Harish Finesse introduces Waze patient portal. Now you can access parts of your medical record, email your doctor's office, and request medication refills online. 1. In your internet browser, go to [email or web address] 2. Click on the First Time User? Click Here link in the Sign In box. You will see the New Member Sign Up page. 3. Enter your Waze Access Code exactly as it appears below. You will not need to use this code after youve completed the sign-up process. If you do not sign up before the expiration date, you must request a new code. · Waze Access Code: [email or web address] Expires: 1/16/2018  1:30 PM 
 
4. Enter the last four digits of your Social Security Number (xxxx) and Date of Birth (mm/dd/yyyy) as indicated and click Submit. You will be taken to the next sign-up page. 5. Create a Waze ID. This will be your Waze login ID and cannot be changed, so think of one that is secure and easy to remember. 6. Create a Waze password. You can change your password at any time. 7. Enter your Password Reset Question and Answer. This can be used at a later time if you forget your password. 8. Enter your e-mail address. You will receive e-mail notification when new information is available in [address] 9. Click Sign Up. You can now view and download portions of your medical record. 10. Click the Download Summary menu link to download a portable copy of your medical information. If you have questions, please visit the Frequently Asked Questions section of the [employer] website. Remember, [employer] is NOT to be used for urgent needs. For medical emergencies, dial 911. Now available from your iPhone and Android! Please provide this summary of care documentation to your next provider. Your primary care clinician is listed as Luz Noel. If you have any questions after today's visit, please call [phone].

## 2017-10-20 NOTE — PROGRESS NOTES
Subjective:      Blanca Rogers is in the office today for cardiac reevaluation. She is a 78-year-old woman that has a history of hypertension, palpitations and SVT. She was complaining of palpitations when she was initially seen in October of 2016. She was started on Toprol-XL 50 mg. She became much less symptomatic with the start of a betablocker. She also had some chest discomfort and a nuclear stress test was done that showed no evidence of ischemia. In May of this year, she said she was experiencing increasing SOB. An echocardiogram was ordered and done on May 27th. She had normal systolic function with no significant valvular pathology. She had no evidence of diastolic dysfunction. She also had no evidence of pulmonary hypertension. At her last visit she was encouraged to start an exercise program. She has done well in that regard and is exercising regularly. Patient Active Problem List    Diagnosis Date Noted    FERNANDES (dyspnea on exertion) 05/22/2017    Palpitations 11/20/2016    BMI 35.0-35.9,adult 10/20/2015    Vitamin D deficiency 10/20/2015    Hypocalcemia 10/20/2015    Postoperative hypothyroidism 10/20/2015    Allergic conjunctivitis of both eyes 05/11/2015    PVCs (premature ventricular contractions)     Dyslipidemia     S/P thyroidectomy 03/27/2014    S/P [facility]ide[facility] ([facility] 03/27/2014    [facility]    HTN (hypertension) 02/27/2014    Pain 02/27/2014     Current Outpatient Prescriptions   Medication Sig Dispense Refill    budesonide-formoterol (SYMBICORT) 160-4.5 mcg/actuation HFA inhaler Take 2 Puffs by inhalation two (2) times a day.  calcitRIOL (ROCALTROL) 0.5 mcg capsule Take 1 mcg by mouth two (2) times a day.  levothyroxine (SYNTHROID) 200 mcg tablet Take  by mouth Daily (before breakfast).  co-enzyme Q-10 (CO Q-10) 100 mg capsule Take 100 mg by mouth daily.       amLODIPine (NORVASC) 10 mg tablet Take  by mouth daily.  atorvastatin (LIPITOR) 20 mg tablet Take  by mouth daily.  metoprolol succinate (TOPROL XL) 50 mg XL tablet Take 0.5 Tabs by mouth daily. 30 Tab 5    multivitamin (ONE A DAY) tablet Take 1 tablet by mouth daily. Allergies   Allergen Reactions    Latex Unknown (comments)    Aspirin Unknown (comments)    Peanut Swelling     Past Medical History:   Diagnosis Date    Dry eye syndrome     Dyslipidemia     H/O hyperthyroidism     s/p thyroidectomy, Dr. Charlee Acharya is managing    History of nuclear stress test 14    EF 72-73%,low risk, no wall motion or valvular abnormalities, normal EKG    Hypertension     PVCs (premature ventricular contractions)     2% of all beats, no VT (Holter 2014)    [facility]     Dr. Myrna Mendoza, Cardiology     Past Surgical History:   Procedure Laterality Date    HX HYSTERECTOMY      HX MOHS PROCEDURES  2009    right    Jessica Jose G  2005    Dr. Renetta Burks     Family History   Problem Relation Age of Onset    Hypertension Mother     Stroke Mother 72          History   Smoking Status    Never Smoker   Smokeless Tobacco    Never Used          Review of Systems, additional:  Constitutional: negative  Eyes: negative  Respiratory: negative  Cardiovascular: negative  Gastrointestinal: negative  Musculoskeletal:negative  Neurological: negative  Behvioral/Psych: negative  Endocrine: negative  ENT: negative    Objective:     Visit Vitals    /81    Pulse 72    Ht 5' 6\" (1.676 m)    Wt 219 lb (99.3 kg)    SpO2 98%    BMI 35.35 kg/m2     General:  alert, cooperative, no distress, appears stated age   Chest Wall: inspection normal - no chest wall deformities or tenderness, respiratory effort normal   Lung: clear to auscultation bilaterally   Heart:  normal rate, regular rhythm, normal S1, S2, no murmurs, rubs, clicks or gallops   Abdomen: soft, non-tender.  Bowel sounds normal. No masses,  no organomegaly   Extremities: extremities normal, atraumatic, no cyanosis or edema Skin: no rashes   Neuro: alert, oriented, normal speech, no focal findings or movement disorder noted         Assessment/Plan:       ICD-10-CM ICD-9-CM    1. Essential hypertension controlled I10 401.9    2. PVCs (premature ventricular contractions) I49.3 427.69    3. SVT (supraventricular tachycardia) (HCC) I47.1 427.89    4. FERNANDES (dyspnea on exertion), likely related in part  to cardiac deconditioning. She has been exercising regularly and feeling much better. RT 6 mos. R06.09 786.09    5.  Palpitations, none recently R00.2 785.1

## 2017-11-01 ENCOUNTER — HOSPITAL ENCOUNTER (OUTPATIENT)
Dept: MAMMOGRAPHY | Age: 57
Discharge: HOME | End: 2017-11-01
Attending: OBSTETRICS & GYNECOLOGY
Payer: COMMERCIAL

## 2017-11-01 DIAGNOSIS — Z12.31 VISIT FOR SCREENING MAMMOGRAM: ICD-10-CM

## 2017-11-01 PROCEDURE — 77063 BREAST TOMOSYNTHESIS BI: CPT

## 2018-03-18 RX ORDER — METOPROLOL SUCCINATE 50 MG/1
TABLET, EXTENDED RELEASE ORAL
Qty: 30 TAB | Refills: 5 | Status: SHIPPED | OUTPATIENT
Start: 2018-03-18 | End: 2019-08-14 | Stop reason: SDUPTHER

## 2018-04-25 ENCOUNTER — OFFICE VISIT (OUTPATIENT)
Dept: CARDIOLOGY CLINIC | Age: 58
End: 2018-04-25

## 2018-04-25 VITALS
BODY MASS INDEX: 35.2 KG/M2 | SYSTOLIC BLOOD PRESSURE: 126 MMHG | HEART RATE: 81 BPM | WEIGHT: 219 LBS | HEIGHT: 66 IN | DIASTOLIC BLOOD PRESSURE: 86 MMHG | OXYGEN SATURATION: 98 %

## 2018-04-25 DIAGNOSIS — E78.5 DYSLIPIDEMIA: ICD-10-CM

## 2018-04-25 DIAGNOSIS — I49.3 PVCS (PREMATURE VENTRICULAR CONTRACTIONS): ICD-10-CM

## 2018-04-25 DIAGNOSIS — R00.2 PALPITATIONS: ICD-10-CM

## 2018-04-25 DIAGNOSIS — R06.09 DOE (DYSPNEA ON EXERTION): ICD-10-CM

## 2018-04-25 DIAGNOSIS — I47.1 SVT (SUPRAVENTRICULAR TACHYCARDIA) (HCC): Primary | ICD-10-CM

## 2018-04-25 DIAGNOSIS — I10 ESSENTIAL HYPERTENSION: Chronic | ICD-10-CM

## 2018-04-25 NOTE — MR AVS SNAPSHOT
Sunil Payan 
 
 
 [facility] [address] [address] 
[phone] Patient: Mir Galindo MRN: [id number] SFN:[id number] Visit Information Date & Time Provider Department Dept. Phone Encounter #  
 4/25/2018 10:45 AM Christine Rodriguez MD [facility] Special[facility] at [facility] [phone] [id number] Follow-up Instructions Return in about 6 months (around 10/25/2018). Your Appointments 10/22/2018 10:45 AM  
Follow Up with Christine Rodriguez MD  
[facility] Appt Note: 6 months [employer] [id number] [address] Upcoming Health Maintenance Date Due DTaP/Tdap/Td series (1 - Tdap) 2/22/1979 PAP AKA CERVICAL CYTOLOGY 2/22/1979 FOBT Q 1 YEAR AGE 50-75 10/20/2016 Influenza Age 5 to Adult 8/1/2017 ZOSTER VACCINE AGE 60> 12/22/2017 BREAST CANCER SCRN MAMMOGRAM 11/1/2019 Allergies as of 4/25/2018  Review Complete On: 4/25/2018 By: Caroline Rosario RN Severity Noted Reaction Type Reactions Latex  01/25/2014    Unknown (comments) Aspirin  01/25/2014    Unknown (comments) Peanut  02/27/2014    Swelling Current Immunizations  Never Reviewed No immunizations on file. Not reviewed this visit Vitals BP Pulse Height(growth percentile) Weight(growth percentile) SpO2 BMI  
 126/86 81 5' 6\" (1.676 m) 219 lb (99.3 kg) 98% 35.35 kg/m2 OB Status Smoking Status Hysterectomy Never Smoker Vitals History BMI and BSA Data Body Mass Index Body Surface Area  
 35.35 kg/m 2 2.15 m 2 Preferred Pharmacy Pharmacy Name Phone [address], [address] [facility] [phone] Your Updated Medication List  
  
   
This list is accurate as of 4/25/18 11:09 AM.  Always use your most recent med list. amLODIPine 10 mg tablet Commonly known as:  Galilea Gardiner Take  by mouth daily. calcitRIOL 0.5 mcg capsule Commonly known as:  ROCALTROL Take 1 mcg by mouth two (2) times a day. CO Q-10 100 mg capsule Generic drug:  co-enzyme Q-10 Take 100 mg by mouth daily. LIPITOR 20 mg tablet Generic drug:  atorvastatin Take  by mouth daily. metoprolol succinate 50 mg XL tablet Commonly known as:  TOPROL-XL  
TAKE ONE-HALF TABLET BY MOUTH ONCE DAILY  
  
 multivitamin tablet Commonly known as:  ONE A DAY Take 1 tablet by mouth daily. SYMBICORT 160-4.5 mcg/actuation Hfaa Generic drug:  budesonide-formoterol Take 2 Puffs by inhalation two (2) times a day. SYNTHROID 200 mcg tablet Generic drug:  levothyroxine Take  by mouth Daily (before breakfast). Follow-up Instructions Return in about 6 months (around 10/25/2018). Introducing [facility]! [employer] introduces [employer] patient portal. Now you can access parts of your medical record, email your doctor's office, and request medication refills online. 1. In your internet browser, go to [email or web address] 2. Click on the First Time User? Click Here link in the Sign In box. You will see the New Member Sign Up page. 3. Enter your [employer] Access Code exactly as it appears below. You will not need to use this code after youve completed the sign-up process. If you do not sign up before the expiration date, you must request a new code. · [employer] Access Code: [email or web address] Expires: 7/24/2018 10:44 AM 
 
4. Enter the last four digits of your Social Security Number (xxxx) and Date of Birth (mm/dd/yyyy) as indicated and click Submit. You will be taken to the next sign-up page. 5. Create a DrNaturalHealingt ID. This will be your [employer] login ID and cannot be changed, so think of one that is secure and easy to remember. 6. Create a [employer] password. You can change your password at any time. 7. Enter your Password Reset Question and Answer. This can be used at a later time if you forget your password. 8. Enter your e-mail address. You will receive e-mail notification when new information is available in [address] 9. Click Sign Up. You can now view and download portions of your medical record. 10. Click the Download Summary menu link to download a portable copy of your medical information. If you have questions, please visit the Frequently Asked Questions section of the [employer] website. Remember, [employer] is NOT to be used for urgent needs. For medical emergencies, dial 911. Now available from your iPhone and Android! Please provide this summary of care documentation to your next provider. Your primary care clinician is listed as Cheo Chapman. If you have any questions after today's visit, please call [phone].

## 2018-04-25 NOTE — LETTER
Patient:  Aiyana Sneed YOB: 1960 Date of Visit: 4/25/2018 Dear Juma Kaplan MD 
[address]  [place] [place] [address] VIA In Basket 
 : Thank you for referring Ms. Aiyana Sneed to me for evaluation/treatment. Below are the relevant portions of my assessment and plan of care. Subjective:  
   Aiyana Sneed is in the office today for cardiac reevaluation. She is a 52-year-old woman that has a history of hypertension, palpitations and SVT. She was complaining of palpitations when she was initially seen in October of 2016. She was started on Toprol-XL 50 mg. She became much less symptomatic with the start of a betablocker. She also had some chest discomfort and a nuclear stress test was done that showed no evidence of ischemia. In May of 2017, she said she was experiencing increasing SOB. An echocardiogram was ordered and done. She had normal systolic function with no significant valvular pathology. She had no evidence of diastolic dysfunction. She also had no evidence of pulmonary hypertension. At her last visit she was encouraged to start an exercise program. She has done well in that regard and is exercising regularly. She has occasional palpitations. She has had no dizziness, near-syncope or syncope. Overall, she feels she is doing well. Patient Active Problem List  
 Diagnosis Date Noted  FERNANDES (dyspnea on exertion) 05/22/2017  Palpitations 11/20/2016  BMI 35.0-35.9,adult 10/20/2015  Vitamin D deficiency 10/20/2015  Postoperative hypothyroidism 10/20/2015  PVCs (premature ventricular contractions)  Dyslipidemia  S/P thyroidectomy 03/27/2014  S/P parathyroidectomy ([facility] 03/27/2014  SVT (supraventricular tachycardia) ([facility] 03/27/2014  
 HTN (hypertension) 02/27/2014 Current Outpatient Prescriptions Medication Sig Dispense Refill  metoprolol succinate (TOPROL-XL) 50 mg XL tablet TAKE ONE-HALF TABLET BY MOUTH ONCE DAILY 30 Tab 5  
 budesonide-formoterol (SYMBICORT) 160-4.5 mcg/actuation HFA inhaler Take 2 Puffs by inhalation two (2) times a day.  calcitRIOL (ROCALTROL) 0.5 mcg capsule Take 1 mcg by mouth two (2) times a day.  levothyroxine (SYNTHROID) 200 mcg tablet Take  by mouth Daily (before breakfast).  co-enzyme Q-10 (CO Q-10) 100 mg capsule Take 100 mg by mouth daily.  amLODIPine (NORVASC) 10 mg tablet Take  by mouth daily.  atorvastatin (LIPITOR) 20 mg tablet Take  by mouth daily.  multivitamin (ONE A DAY) tablet Take 1 tablet by mouth daily. Allergies Allergen Reactions  Latex Unknown (comments)  Aspirin Unknown (comments)  Peanut Swelling Past Medical History:  
Diagnosis Date  Dry eye syndrome  Dyslipidemia  H/O hyperthyroidism   
 s/p thyroidectomy, Dr. Yoli Chu is managing  History of nuclear stress test 14 EF 72-73%,low risk, no wall motion or valvular abnormalities, normal EKG  Hypertension  PVCs (premature ventricular contractions) 2% of all beats, no VT (Holter 2014)  SVT [facility]supraventricular tachycardia) ([facility] Dr. Bharti Kay, Cardiology Past Surgical History:  
Procedure Laterality Date [facility] MOHS PROCEDURES    
 right  HX THYROIDECTOMY   Dr. Dayanna Zee Family History Problem Relation Age of Onset  Hypertension Mother  Stroke Mother 72  
   History Smoking Status  Never Smoker Smokeless Tobacco  
 Never Used Review of Systems, additional: 
Constitutional: negative Eyes: negative Respiratory: negative Cardiovascular: negative Gastrointestinal: negative Musculoskeletal:negative Neurological: negative Behvioral/Psych: negative Endocrine: negative ENT: negative Objective:  
 
Visit Vitals  /86  Pulse 81  
  Ht 5' 6\" (1.676 m)  Wt 219 lb (99.3 kg)  SpO2 98%  BMI 35.35 kg/m2 General:  alert, cooperative, no distress, appears stated age Chest Wall: inspection normal - no chest wall deformities or tenderness, respiratory effort normal  
Lung: clear to auscultation bilaterally Heart:  normal rate, regular rhythm, normal S1, S2, no murmurs, rubs, clicks or gallops Abdomen: soft, non-tender. Bowel sounds normal. No masses,  no organomegaly Extremities: extremities normal, atraumatic, no cyanosis or edema Skin: no rashes Neuro: alert, oriented, normal speech, no focal findings or movement disorder noted Assessment/Plan: ICD-10-CM ICD-9-CM 1. Essential hypertension controlled I10 401.9 2. PVCs (premature ventricular contractions) I49.3 427.69   
3. SVT (supraventricular tachycardia) (HCC) I47.1 427.89   
4. FERNANDES (dyspnea on exertion), likely related in part  to cardiac deconditioning. She continues to  exercise regularly. RT 6 mos. R06.09 786.09   
5. Palpitations, occaional R00.2 785.1 If you have questions, please do not hesitate to call me. I look forward to following Ms. Karolyn Rankinaxel along with you. Sincerely, Rosario Garcia MD

## 2018-04-30 NOTE — PROGRESS NOTES
Subjective:      Geovani Yadav is in the office today for cardiac reevaluation. She is a 40-year-old woman that has a history of hypertension, palpitations and SVT. She was complaining of palpitations when she was initially seen in October of 2016. She was started on Toprol-XL 50 mg. She became much less symptomatic with the start of a betablocker. She also had some chest discomfort and a nuclear stress test was done that showed no evidence of ischemia. In May of 2017, she said she was experiencing increasing SOB. An echocardiogram was ordered and done. She had normal systolic function with no significant valvular pathology. She had no evidence of diastolic dysfunction. She also had no evidence of pulmonary hypertension. At her last visit she was encouraged to start an exercise program. She has done well in that regard and is exercising regularly. She has occasional palpitations. She has had no dizziness, near-syncope or syncope. Overall, she feels she is doing well. Patient Active Problem List    Diagnosis Date Noted    FERNANDES (dyspnea on exertion) 05/22/2017    Palpitations 11/20/2016    BMI 35.0-35.9,adult 10/20/2015    Vitamin D deficiency 10/20/2015    Postoperative hypothyroidism 10/20/2015    PVCs (premature ventricular contractions)     Dyslipidemia     S/P thyroidectomy 03/27/2014    S/P par[facility]roidectomy ([facility] 03/27/2014    SVT (supraventricular tachycardia) ([facility] 03/27/2014    HTN (hypertension) 02/27/2014     Current Outpatient Prescriptions   Medication Sig Dispense Refill    metoprolol succinate (TOPROL-XL) 50 mg XL tablet TAKE ONE-HALF TABLET BY MOUTH ONCE DAILY 30 Tab 5    budesonide-formoterol (SYMBICORT) 160-4.5 mcg/actuation HFA inhaler Take 2 Puffs by inhalation two (2) times a day.  calcitRIOL (ROCALTROL) 0.5 mcg capsule Take 1 mcg by mouth two (2) times a day.       levothyroxine (SYNTHROID) 200 mcg tablet Take  by mouth Daily (before breakfast).  co-enzyme Q-10 (CO Q-10) 100 mg capsule Take 100 mg by mouth daily.  amLODIPine (NORVASC) 10 mg tablet Take  by mouth daily.  atorvastatin (LIPITOR) 20 mg tablet Take  by mouth daily.  multivitamin (ONE A DAY) tablet Take 1 tablet by mouth daily.        Allergies   Allergen Reactions    Latex Unknown (comments)    Aspirin Unknown (comments)    Peanut Swelling     Past Medical History:   Diagnosis Date    Dry eye syndrome     Dyslipidemia     H/O hyperthyroidism     s/p thyroidectomy, Dr. Kamala Cadena is managing    History of nuclear stress test 14    EF 72-73%,low risk, no wall motion or valvular abnormalities, normal EKG    Hypertension     PVCs (premature ventricular contractions)     2% of all beats, no VT (Holter 2014)    SVT [facility]supraventricular tachycardia) ([facility])     Dr. Flor Tao, Cardiology     Past Surgical History:   Procedure Laterality Date    HX HYSTERECTOMY      HX MOHS PROCEDURES  2009    right    Jaden Genevieve      Dr. Bailey [place]     Family History   Problem Relation Age of Onset    Hypertension Mother     Stroke Mother 72          History   Smoking Status    Never Smoker   Smokeless Tobacco    Never Used          Review of Systems, additional:  Constitutional: negative  Eyes: negative  Respiratory: negative  Cardiovascular: negative  Gastrointestinal: negative  Musculoskeletal:negative  Neurological: negative  Behvioral/Psych: negative  Endocrine: negative  ENT: negative    Objective:     Visit Vitals    /86    Pulse 81    Ht 5' 6\" (1.676 m)    Wt 219 lb (99.3 kg)    SpO2 98%    BMI 35.35 kg/m2     General:  alert, cooperative, no distress, appears stated age   Chest Wall: inspection normal - no chest wall deformities or tenderness, respiratory effort normal   Lung: clear to auscultation bilaterally   Heart:  normal rate, regular rhythm, normal S1, S2, no murmurs, rubs, clicks or gallops   Abdomen: soft, non-tender. Bowel sounds normal. No masses,  no organomegaly   Extremities: extremities normal, atraumatic, no cyanosis or edema Skin: no rashes   Neuro: alert, oriented, normal speech, no focal findings or movement disorder noted         Assessment/Plan:       ICD-10-CM ICD-9-CM    1. Essential hypertension controlled I10 401.9    2. PVCs (premature ventricular contractions) I49.3 427.69    3. SVT (supraventricular tachycardia) (HCC) I47.1 427.89    4. FERNANDES (dyspnea on exertion), likely related in part  to cardiac deconditioning. She continues to  exercise regularly. RT 6 mos. R06.09 786.09    5.  Palpitations, occaional R00.2 785.1

## 2018-06-05 NOTE — COMMUNICATION BODY
Subjective:      Braxton Cervantes is in the office today for cardiac reevaluation. She is a 58-year-old woman that has a history of hypertension, palpitations and SVT. She was complaining of palpitations when she was initially seen in October of 2016. She was started on Toprol-XL 50 mg. She became much less symptomatic with the start of a betablocker. She also had some chest discomfort and a nuclear stress test was done that showed no evidence of ischemia. In May of 2017, she said she was experiencing increasing SOB. An echocardiogram was ordered and done. She had normal systolic function with no significant valvular pathology. She had no evidence of diastolic dysfunction. She also had no evidence of pulmonary hypertension. At her last visit she was encouraged to start an exercise program. She has done well in that regard and is exercising regularly. She has occasional palpitations. She has had no dizziness, near-syncope or syncope. Overall, she feels she is doing well. Patient Active Problem List    Diagnosis Date Noted    FERNANDES (dyspnea on exertion) 05/22/2017    Palpitations 11/20/2016    BMI 35.0-35.9,adult 10/20/2015    Vitamin D deficiency 10/20/2015    Postoperative hypothyroidism 10/20/2015    PVCs (premature ventricular contractions)     Dyslipidemia     S/P thyroidectomy 03/27/2014    S/P par[facility]roidectomy ([facility] 03/27/2014    SVT (supraventricular tachycardia) ([facility] 03/27/2014    HTN (hypertension) 02/27/2014     Current Outpatient Prescriptions   Medication Sig Dispense Refill    metoprolol succinate (TOPROL-XL) 50 mg XL tablet TAKE ONE-HALF TABLET BY MOUTH ONCE DAILY 30 Tab 5    budesonide-formoterol (SYMBICORT) 160-4.5 mcg/actuation HFA inhaler Take 2 Puffs by inhalation two (2) times a day.  calcitRIOL (ROCALTROL) 0.5 mcg capsule Take 1 mcg by mouth two (2) times a day.       levothyroxine (SYNTHROID) 200 mcg tablet Take  by mouth Daily (before breakfast).  co-enzyme Q-10 (CO Q-10) 100 mg capsule Take 100 mg by mouth daily.  amLODIPine (NORVASC) 10 mg tablet Take  by mouth daily.  atorvastatin (LIPITOR) 20 mg tablet Take  by mouth daily.  multivitamin (ONE A DAY) tablet Take 1 tablet by mouth daily.        Allergies   Allergen Reactions    Latex Unknown (comments)    Aspirin Unknown (comments)    Peanut Swelling     Past Medical History:   Diagnosis Date    Dry eye syndrome     Dyslipidemia     H/O hyperthyroidism     s/p thyroidectomy, Dr. Kimmie Cantu is managing    History of nuclear stress test 14    EF 72-73%,low risk, no wall motion or valvular abnormalities, normal EKG    Hypertension     PVCs (premature ventricular contractions)     2% of all beats, no VT (Holter 2014)    SVT [facility]ricular tachycardia) ([facility])     Dr. Joan Sahni, Cardiology     Past Surgical History:   Procedure Laterality Date    HX HYSTERECTOMY      HX MOHS PROCEDURES  2009    right    Terry Jackman      Dr. Arianna Crandall     Family History   Problem Relation Age of Onset    Hypertension Mother     Stroke Mother 72          History   Smoking Status    Never Smoker   Smokeless Tobacco    Never Used          Review of Systems, additional:  Constitutional: negative  Eyes: negative  Respiratory: negative  Cardiovascular: negative  Gastrointestinal: negative  Musculoskeletal:negative  Neurological: negative  Behvioral/Psych: negative  Endocrine: negative  ENT: negative    Objective:     Visit Vitals    /86    Pulse 81    Ht 5' 6\" (1.676 m)    Wt 219 lb (99.3 kg)    SpO2 98%    BMI 35.35 kg/m2     General:  alert, cooperative, no distress, appears stated age   Chest Wall: inspection normal - no chest wall deformities or tenderness, respiratory effort normal   Lung: clear to auscultation bilaterally   Heart:  normal rate, regular rhythm, normal S1, S2, no murmurs, rubs, clicks or gallops   Abdomen: soft, non-tender. Bowel sounds normal. No masses,  no organomegaly   Extremities: extremities normal, atraumatic, no cyanosis or edema Skin: no rashes   Neuro: alert, oriented, normal speech, no focal findings or movement disorder noted         Assessment/Plan:       ICD-10-CM ICD-9-CM    1. Essential hypertension controlled I10 401.9    2. PVCs (premature ventricular contractions) I49.3 427.69    3. SVT (supraventricular tachycardia) (HCC) I47.1 427.89    4. FERNANDES (dyspnea on exertion), likely related in part  to cardiac deconditioning. She continues to  exercise regularly. RT 6 mos. R06.09 786.09    5.  Palpitations, occaional R00.2 785.1

## 2018-12-17 ENCOUNTER — OFFICE VISIT (OUTPATIENT)
Dept: CARDIOLOGY CLINIC | Age: 58
End: 2018-12-17

## 2018-12-17 VITALS
BODY MASS INDEX: 36.8 KG/M2 | OXYGEN SATURATION: 98 % | DIASTOLIC BLOOD PRESSURE: 92 MMHG | HEIGHT: 66 IN | HEART RATE: 78 BPM | WEIGHT: 229 LBS | SYSTOLIC BLOOD PRESSURE: 134 MMHG

## 2018-12-17 DIAGNOSIS — E78.5 DYSLIPIDEMIA: ICD-10-CM

## 2018-12-17 DIAGNOSIS — R00.2 PALPITATIONS: ICD-10-CM

## 2018-12-17 DIAGNOSIS — R06.09 DOE (DYSPNEA ON EXERTION): ICD-10-CM

## 2018-12-17 DIAGNOSIS — E66.01 SEVERE OBESITY (HCC): ICD-10-CM

## 2018-12-17 DIAGNOSIS — I10 ESSENTIAL HYPERTENSION: Chronic | ICD-10-CM

## 2018-12-17 DIAGNOSIS — I47.1 SVT (SUPRAVENTRICULAR TACHYCARDIA) (HCC): ICD-10-CM

## 2018-12-17 DIAGNOSIS — E89.0 POSTOPERATIVE HYPOTHYROIDISM: ICD-10-CM

## 2018-12-17 DIAGNOSIS — I49.3 PVCS (PREMATURE VENTRICULAR CONTRACTIONS): Primary | ICD-10-CM

## 2018-12-17 NOTE — PROGRESS NOTES
1. Have you been to the ER, urgent care clinic since your last visit? Hospitalized since your last visit? No    2. Have you seen or consulted any other health care providers outside of the [address] [place] [address] since your last visit? Include any pap smears or colon screening.  No

## 2018-12-30 PROBLEM — E66.01 SEVERE OBESITY (HCC): Status: ACTIVE | Noted: 2018-12-30

## 2018-12-30 NOTE — PROGRESS NOTES
Subjective:      Lissa Parra is in the office today for cardiac reevaluation. She is a 78-year-old woman that has a history of hypertension, palpitations and SVT. She was complaining of palpitations when she was initially seen in October of 2016. She was started on Toprol-XL 50 mg. She became much less symptomatic with the start of a betablocker. She also had some chest discomfort and a nuclear stress test was done that showed no evidence of ischemia. In May of 2017, she said she was experiencing increasing SOB. An echocardiogram was ordered and done. She had normal systolic function with no significant valvular pathology. She had no evidence of diastolic dysfunction. She also had no evidence of pulmonary hypertension. In the office today, reports no chest pain or shortness of breath. She plans to go back to the gym soon. She has been  having problems with her sciatic nerve and has been getting back injections. She has also had carpal tunnel surgery. Patient Active Problem List    Diagnosis Date Noted    Severe obesity ([facility] 12/30/2018    FERNANDES (dyspnea on exertion) 05/22/2017    Palpitations 11/20/2016    BMI 35.0-35.9,adult 10/20/2015    Vitamin D deficiency 10/20/2015    Postoperative hypothyroidism 10/20/2015    PVCs (premature ventricular contractions)     Dyslipidemia     S/P thyroidectomy 03/27/2014    [facility] 03/27/2014    SVT (supraventricular tachycardia) ([facility]) 03/27/2014    HTN (hypertension) 02/27/2014     Current Outpatient Medications   Medication Sig Dispense Refill    metoprolol succinate (TOPROL-XL) 50 mg XL tablet TAKE ONE-HALF TABLET BY MOUTH ONCE DAILY 30 Tab 5    budesonide-formoterol (SYMBICORT) 160-4.5 mcg/actuation HFA inhaler Take 2 Puffs by inhalation two (2) times a day.  calcitRIOL (ROCALTROL) 0.5 mcg capsule Take 1 mcg by mouth two (2) times a day.       levothyroxine (SYNTHROID) 200 mcg tablet Take  by mouth Daily (before breakfast).  co-enzyme Q-10 (CO Q-10) 100 mg capsule Take 100 mg by mouth daily.  amLODIPine (NORVASC) 10 mg tablet Take  by mouth daily.  atorvastatin (LIPITOR) 20 mg tablet Take  by mouth daily.  multivitamin (ONE A DAY) tablet Take 1 tablet by mouth daily.        Allergies   Allergen Reactions    Latex Unknown (comments)    Aspirin Unknown (comments)    Peanut Swelling     Past Medical History:   Diagnosis Date    Dry eye syndrome     Dyslipidemia     H/O hyperthyroidism     s/p thyroidectomy, Dr. Gladis Baltazar is managing    History of nuclear stress test 14    EF 72-73%,low risk, no wall motion or valvular abnormalities, normal EKG    Hypertension     PVCs (premature ventricular contractions)     2% of all beats, no VT (Holter 2014)    SVT [facility]ricular tachycardia) ([facility])     Dr. Clementina Awan, Cardiology     Past Surgical History:   Procedure Laterality Date    HX HYSTERECTOMY      HX MOHS PROCEDURES  2009    right    Kory Snyder  2005    Dr. eLsa Emery     Family History   Problem Relation Age of Onset    Hypertension Mother     Stroke Mother 72             Social History     Tobacco Use   Smoking Status Never Smoker   Smokeless Tobacco Never Used          Review of Systems, additional:  Constitutional: negative  Eyes: negative  Respiratory: negative  Cardiovascular: negative  Gastrointestinal: negative  Musculoskeletal:negative  Neurological: negative  Behvioral/Psych: negative  Endocrine: negative  ENT: negative    Objective:     Visit Vitals  BP (!) 134/92   Pulse 78   Ht 5' 6\" (1.676 m)   Wt 229 lb (103.9 kg)   SpO2 98%   BMI 36.96 kg/m²     General:  alert, cooperative, no distress, appears stated age   Chest Wall: inspection normal - no chest wall deformities or tenderness, respiratory effort normal   Lung: clear to auscultation bilaterally   Heart:  normal rate, regular rhythm, normal S1, S2, no murmurs, rubs, clicks or gallops Abdomen: soft, non-tender. Bowel sounds normal. No masses,  no organomegaly   Extremities: extremities normal, atraumatic, no cyanosis or edema Skin: no rashes   Neuro: alert, oriented, normal speech, no focal findings or movement disorder noted         Assessment/Plan:       ICD-10-CM ICD-9-CM    1. Essential hypertension , mildly elevated diastolic pressure in the office today I10 401.9    2. PVCs (premature ventricular contractions) I49.3 427.69    3. SVT (supraventricular tachycardia) (HCC) I47.1 427.89    4. FERNANDES (dyspnea on exertion), likely related in part  to cardiac deconditioning. Continue exercise program. RT 6 mos. R06.09 786.09    5.  Palpitations, occaional R00.2 785.1

## 2019-01-07 ENCOUNTER — HOSPITAL ENCOUNTER (OUTPATIENT)
Dept: MAMMOGRAPHY | Age: 59
Discharge: HOME | End: 2019-01-07
Payer: COMMERCIAL

## 2019-01-07 DIAGNOSIS — Z12.31 VISIT FOR SCREENING MAMMOGRAM: ICD-10-CM

## 2019-01-07 PROCEDURE — 77063 BREAST TOMOSYNTHESIS BI: CPT

## 2019-06-24 ENCOUNTER — OFFICE VISIT (OUTPATIENT)
Dept: CARDIOLOGY CLINIC | Age: 59
End: 2019-06-24

## 2019-06-24 VITALS
BODY MASS INDEX: 37.93 KG/M2 | OXYGEN SATURATION: 98 % | HEART RATE: 85 BPM | WEIGHT: 235 LBS | DIASTOLIC BLOOD PRESSURE: 76 MMHG | SYSTOLIC BLOOD PRESSURE: 121 MMHG

## 2019-06-24 DIAGNOSIS — I49.3 PVCS (PREMATURE VENTRICULAR CONTRACTIONS): ICD-10-CM

## 2019-06-24 DIAGNOSIS — R00.2 PALPITATIONS: ICD-10-CM

## 2019-06-24 DIAGNOSIS — I47.1 SVT (SUPRAVENTRICULAR TACHYCARDIA) (HCC): ICD-10-CM

## 2019-06-24 DIAGNOSIS — E78.5 DYSLIPIDEMIA: ICD-10-CM

## 2019-06-24 DIAGNOSIS — I10 ESSENTIAL HYPERTENSION: Primary | Chronic | ICD-10-CM

## 2019-06-24 DIAGNOSIS — E66.01 SEVERE OBESITY (HCC): ICD-10-CM

## 2019-06-24 DIAGNOSIS — R06.09 DOE (DYSPNEA ON EXERTION): ICD-10-CM

## 2019-06-24 DIAGNOSIS — E89.0 S/P THYROIDECTOMY: Chronic | ICD-10-CM

## 2019-06-24 DIAGNOSIS — E89.2 S/P PARATHYROIDECTOMY (HCC): Chronic | ICD-10-CM

## 2019-06-24 NOTE — PROGRESS NOTES
1. Have you been to the ER, urgent care clinic since your last visit? Hospitalized since your last visit? No     2. Have you seen or consulted any other health care providers outside of the [address] [place] [address] since your last visit? Include any pap smears or colon screening.   No
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
## 2019-08-14 RX ORDER — METOPROLOL SUCCINATE 50 MG/1
TABLET, EXTENDED RELEASE ORAL
Qty: 30 TAB | Refills: 5 | Status: SHIPPED | OUTPATIENT
Start: 2019-08-14 | End: 2020-11-21

## 2020-02-28 ENCOUNTER — HOSPITAL ENCOUNTER (OUTPATIENT)
Dept: MAMMOGRAPHY | Age: 60
Discharge: HOME | End: 2020-02-28
Attending: FAMILY MEDICINE
Payer: COMMERCIAL

## 2020-02-28 DIAGNOSIS — Z12.31 VISIT FOR SCREENING MAMMOGRAM: ICD-10-CM

## 2020-02-28 PROCEDURE — 77063 BREAST TOMOSYNTHESIS BI: CPT

## 2020-07-27 ENCOUNTER — OFFICE VISIT (OUTPATIENT)
Dept: CARDIOLOGY CLINIC | Age: 60
End: 2020-07-27

## 2020-07-27 VITALS
TEMPERATURE: 99.9 F | HEIGHT: 66 IN | BODY MASS INDEX: 36.8 KG/M2 | SYSTOLIC BLOOD PRESSURE: 118 MMHG | DIASTOLIC BLOOD PRESSURE: 77 MMHG | OXYGEN SATURATION: 98 % | HEART RATE: 75 BPM | WEIGHT: 229 LBS

## 2020-07-27 DIAGNOSIS — E89.2 S/P PARATHYROIDECTOMY (HCC): ICD-10-CM

## 2020-07-27 DIAGNOSIS — I47.1 SVT (SUPRAVENTRICULAR TACHYCARDIA) (HCC): ICD-10-CM

## 2020-07-27 DIAGNOSIS — E66.01 SEVERE OBESITY (HCC): ICD-10-CM

## 2020-07-27 DIAGNOSIS — E89.0 POSTOPERATIVE HYPOTHYROIDISM: ICD-10-CM

## 2020-07-27 DIAGNOSIS — E89.0 S/P THYROIDECTOMY: Primary | ICD-10-CM

## 2020-07-27 DIAGNOSIS — E78.5 DYSLIPIDEMIA: ICD-10-CM

## 2020-07-27 NOTE — PROGRESS NOTES
Miki Velazco presents today for   Chief Complaint   Patient presents with    Follow-up       Miki Velazco preferred language for health care discussion is english/other. Is someone accompanying this pt? np    Is the patient using any DME equipment during OV? no    Depression Screening:  3 most recent PHQ Screens 7/27/2020   Little interest or pleasure in doing things Not at all   Feeling down, depressed, irritable, or hopeless Not at all   Total Score PHQ 2 0       Learning Assessment:  Learning Assessment 2/27/2014   PRIMARY LEARNER Patient   HIGHEST LEVEL OF EDUCATION - PRIMARY LEARNER  GRADUATED HIGH SCHOOL OR GED   BARRIERS PRIMARY LEARNER NONE   PRIMARY LANGUAGE ENGLISH    NEED No   LEARNER PREFERENCE PRIMARY READING   LEARNING SPECIAL TOPICS none   ANSWERED BY patient   RELATIONSHIP SELF       Abuse Screening:  Completed      Fall Risk  Completed    Pt currently taking Anticoagulant therapy?  no    Coordination of Care:  1. Have you been to the ER, urgent care clinic since your last visit? Hospitalized since your last visit? no    2. Have you seen or consulted any other health care providers outside of the [address] [place] [address] since your last visit? Include any pap smears or colon screening.  no

## 2020-08-01 NOTE — PROGRESS NOTES
Subjective:      Raphael Blake is in the office today for cardiac reevaluation. She is a 78-year-old woman that has a history of hypertension, palpitations and SVT. She was complaining of palpitations when she was initially seen in October of 2016. She was started on Toprol-XL 50 mg. She became much less symptomatic with the start of a betablocker. She also had some chest discomfort and a nuclear stress test was done that showed no evidence of ischemia. In May of 2017, she said she was experiencing increasing SOB. An echocardiogram was done. She had normal systolic function with no significant valvular pathology. She had no evidence of diastolic dysfunction. She also had no evidence of pulmonary hypertension. In the office today, she continues to have problems with her sciatic nerve. She has recently had some injections in that regard. Because of the  pandemic, she has not been able to go to the gym. She has had no chest pain. Her dyspnea on exertion is unchanged. Patient Active Problem List    Diagnosis Date Noted    Severe obesity ([facility] 12/30/2018    FERNANDES (dyspnea on exertion) 05/22/2017    Palpitations 11/20/2016    BMI 35.0-35.9,adult 10/20/2015    Vitamin D deficiency 10/20/2015    Postoperative hypothyroidism 10/20/2015    PVCs (premature ventricular contractions)     Dyslipidemia     S/P thyroidectomy 03/27/2014    S/P parathyroidectomy 03/27/2014    SVT (supraventricular tachycardia) (HCC) 03/27/2014    HTN (hypertension) 02/27/2014     Current Outpatient Medications   Medication Sig Dispense Refill    metoprolol succinate (TOPROL-XL) 50 mg XL tablet TAKE 1/2 (ONE-HALF) TABLET BY MOUTH ONCE DAILY 30 Tab 5    budesonide-formoterol (SYMBICORT) 160-4.5 mcg/actuation HFA inhaler Take 2 Puffs by inhalation two (2) times a day.  calcitRIOL (ROCALTROL) 0.5 mcg capsule Take 1 mcg by mouth two (2) times a day.       levothyroxine (Synthroid) 150 mcg tablet Take  by mouth Daily (before breakfast).  co-enzyme Q-10 (CO Q-10) 100 mg capsule Take 100 mg by mouth daily.  amLODIPine (NORVASC) 10 mg tablet Take  by mouth daily.  atorvastatin (LIPITOR) 20 mg tablet Take  by mouth daily.  multivitamin (ONE A DAY) tablet Take 1 tablet by mouth daily.        Allergies   Allergen Reactions    Latex Unknown (comments)    Aspirin Unknown (comments)    Peanut Swelling     Past Medical History:   Diagnosis Date    Dry eye syndrome     Dyslipidemia     H/O hyperthyroidism     s/p thyroidectomy, Dr. Gutierrez Marks is managing    History of nuclear stress test 14    EF 72-73%,low risk, no wall motion or valvular abnormalities, normal EKG    Hypertension     PVCs (premature ventricular contractions)     2% of all beats, no VT (Holter 2014)    SVT (supraventricular tachycardia) ([facility])     Dr. Christopher Adams, Cardiology     Past Surgical History:   Procedure Laterality Date    HX HYSTERECTOMY      HX MOHS PROCEDURES  2009    right    Robley Overcast  2005    Dr. Wilfredo Woodward     Family History   Problem Relation Age of Onset    Hypertension Mother     Stroke Mother 72             Social History     Tobacco Use   Smoking Status Never Smoker   Smokeless Tobacco Never Used          Review of Systems, additional:  Constitutional: negative  Eyes: negative  Respiratory: negative  Cardiovascular: negative  Gastrointestinal: negative  Musculoskeletal:negative  Neurological: negative  Behvioral/Psych: negative  Endocrine: negative  ENT: negative    Objective:     Visit Vitals  /77   Pulse 75   Temp 99.9 °F (37.7 °C) (Oral)   Ht 5' 6\" (1.676 m)   Wt 229 lb (103.9 kg)   SpO2 98%   BMI 36.96 kg/m²     General:  alert, cooperative, no distress, appears stated age   Chest Wall: inspection normal - no chest wall deformities or tenderness, respiratory effort normal   Lung: clear to auscultation bilaterally   Heart:  normal rate, regular rhythm, normal S1, S2, no murmurs, rubs, clicks or gallops   Abdomen: soft, non-tender. Bowel sounds normal. No masses,  no organomegaly   Extremities: extremities normal, atraumatic, no cyanosis or edema Skin: no rashes   Neuro: alert, oriented, normal speech, no focal findings or movement disorder noted         Assessment/Plan:       ICD-10-CM ICD-9-CM    1. Essential hypertension, controlled blood pressure in the office today I10 401.9    2. PVCs (premature ventricular contractions) I49.3 427.69    3. SVT (supraventricular tachycardia) (HCC) I47.1 427.89    4. FERNANDES (dyspnea on exertion), likely related in part  to cardiac deconditioning. Stable. Return in 1 year. R06.09 786.09    5.  Palpitations, occasional.  Stable R00.2 785.1

## 2020-08-06 ENCOUNTER — APPOINTMENT (OUTPATIENT)
Dept: PHYSICAL THERAPY | Age: 60
End: 2020-08-06

## 2020-08-14 ENCOUNTER — APPOINTMENT (OUTPATIENT)
Dept: NUTRITION | Age: 60
End: 2020-08-14

## 2020-11-21 RX ORDER — METOPROLOL SUCCINATE 50 MG/1
TABLET, EXTENDED RELEASE ORAL
Qty: 30 TAB | Refills: 0 | Status: SHIPPED | OUTPATIENT
Start: 2020-11-21 | End: 2021-01-29

## 2021-01-29 NOTE — TELEPHONE ENCOUNTER
PCP: Vicente Chowdhury MD    Last appt: 7/27/2020  Future Appointments   Date Time Provider Glenroy Brownei   3/5/2021 11:45 AM [address] TREY BYNUM BX RM 1 [facility] [address]       Requested Prescriptions     Pending Prescriptions Disp Refills    metoprolol succinate (TOPROL-XL) 50 mg XL tablet [Pharmacy Med Name: Metoprolol Succinate ER 50 MG Oral Tablet Extended Release 24 Hour] 30 Tab 0     Sig: Take 1/2 (one-half) tablet by mouth once daily       Request for a 30 or 90 day supply? Provider Discretion    Pharmacy: Confirmed    Other Comments:  Medication refill request via phone.

## 2021-01-30 RX ORDER — METOPROLOL SUCCINATE 50 MG/1
TABLET, EXTENDED RELEASE ORAL
Qty: 30 TAB | Refills: 2 | Status: SHIPPED | OUTPATIENT
Start: 2021-01-30 | End: 2021-10-25

## 2021-03-17 ENCOUNTER — HOSPITAL ENCOUNTER (OUTPATIENT)
Dept: MAMMOGRAPHY | Age: 61
Discharge: HOME | End: 2021-03-17
Attending: FAMILY MEDICINE
Payer: COMMERCIAL

## 2021-03-17 DIAGNOSIS — Z12.31 VISIT FOR SCREENING MAMMOGRAM: ICD-10-CM

## 2021-03-17 PROCEDURE — 77063 BREAST TOMOSYNTHESIS BI: CPT

## 2021-10-25 RX ORDER — METOPROLOL SUCCINATE 50 MG/1
TABLET, EXTENDED RELEASE ORAL
Qty: 30 TABLET | Refills: 0 | Status: SHIPPED | OUTPATIENT
Start: 2021-10-25 | End: 2022-02-07

## 2022-02-07 RX ORDER — METOPROLOL SUCCINATE 50 MG/1
TABLET, EXTENDED RELEASE ORAL
Qty: 30 TABLET | Refills: 0 | Status: SHIPPED | OUTPATIENT
Start: 2022-02-07 | End: 2022-04-28

## 2022-03-18 PROBLEM — E66.01 SEVERE OBESITY (HCC): Status: ACTIVE | Noted: 2018-12-30

## 2022-03-19 PROBLEM — R06.09 DOE (DYSPNEA ON EXERTION): Status: ACTIVE | Noted: 2017-05-22

## 2022-03-23 ENCOUNTER — HOSPITAL ENCOUNTER (OUTPATIENT)
Dept: WOMENS IMAGING | Age: 62
Discharge: HOME | End: 2022-03-23
Attending: OBSTETRICS & GYNECOLOGY
Payer: COMMERCIAL

## 2022-03-23 DIAGNOSIS — Z12.31 VISIT FOR SCREENING MAMMOGRAM: ICD-10-CM

## 2022-03-23 PROCEDURE — 77063 BREAST TOMOSYNTHESIS BI: CPT

## 2022-04-28 RX ORDER — METOPROLOL SUCCINATE 50 MG/1
TABLET, EXTENDED RELEASE ORAL
Qty: 30 TABLET | Refills: 0 | Status: SHIPPED | OUTPATIENT
Start: 2022-04-28 | End: 2022-07-06

## 2022-07-06 RX ORDER — METOPROLOL SUCCINATE 50 MG/1
TABLET, EXTENDED RELEASE ORAL
Qty: 30 TABLET | Refills: 0 | Status: SHIPPED | OUTPATIENT
Start: 2022-07-06 | End: 2022-07-22 | Stop reason: ALTCHOICE

## 2022-07-22 ENCOUNTER — OFFICE VISIT (OUTPATIENT)
Dept: CARDIOLOGY CLINIC | Age: 62
End: 2022-07-22
Payer: COMMERCIAL

## 2022-07-22 VITALS
SYSTOLIC BLOOD PRESSURE: 124 MMHG | WEIGHT: 227 LBS | BODY MASS INDEX: 36.64 KG/M2 | DIASTOLIC BLOOD PRESSURE: 84 MMHG | HEART RATE: 69 BPM | OXYGEN SATURATION: 97 %

## 2022-07-22 DIAGNOSIS — I47.1 SVT (SUPRAVENTRICULAR TACHYCARDIA) (HCC): Primary | ICD-10-CM

## 2022-07-22 PROCEDURE — 99214 OFFICE O/P EST MOD 30 MIN: CPT | Performed by: INTERNAL MEDICINE

## 2022-07-22 PROCEDURE — 93000 ELECTROCARDIOGRAM COMPLETE: CPT | Performed by: INTERNAL MEDICINE

## 2022-07-22 RX ORDER — ALBUTEROL SULFATE 90 UG/1
AEROSOL, METERED RESPIRATORY (INHALATION)
COMMUNITY
End: 2022-10-21 | Stop reason: ALTCHOICE

## 2022-07-22 NOTE — TELEPHONE ENCOUNTER
PCP: Dalila Ko MD    Last appt: 7/22/2022  Future Appointments   Date Time Provider Glenroy Nur   3/27/2023  7:00 AM [facility] TREY STEREO BX RM 1 [address]       Requested Prescriptions     Pending Prescriptions Disp Refills    metoprolol succinate (TOPROL-XL) 50 mg XL tablet 90 Tablet 3     Sig: Take 1 Tablet by mouth in the morning. amLODIPine (NORVASC) 5 mg tablet 90 Tablet 3     Sig: Take 1 Tablet by mouth in the morning.

## 2022-07-22 NOTE — PROGRESS NOTES
Identified pt with two pt identifiers(name and ). Reviewed record in preparation for visit and have obtained necessary documentation. Chino Ramirez presents today for   Chief Complaint   Patient presents with    Follow-up       Pt denies DIZZINESS, SOB, CHEST PAIN/ PRESSURE, FATIGUE/WEAKNESS, HEADACHES, SWELLING. Chino Ramirez preferred language for health care discussion is english/other. Personal Protective Equipment:   Personal Protective Equipment was used including: mask-surgical and hands-gloves. Patient was placed on no precaution(s). Patient was masked. Precautions:   Patient currently on None  Patient currently roomed with door closed. Is someone accompanying this pt? no    Is the patient using any DME equipment during [address]? no    Depression Screening:  3 most recent PHQ Screens 2022   Little interest or pleasure in doing things Not at all   Feeling down, depressed, irritable, or hopeless Not at all   Total Score PHQ 2 0       Learning Assessment:  Learning Assessment 2014   PRIMARY LEARNER Patient   HIGHEST LEVEL OF EDUCATION - PRIMARY LEARNER  GRADUATED HIGH SCHOOL OR GED   BARRIERS PRIMARY LEARNER NONE   PRIMARY LANGUAGE ENGLISH    NEED No   LEARNER PREFERENCE PRIMARY READING   LEARNING SPECIAL TOPICS none   ANSWERED BY patient   RELATIONSHIP SELF       Abuse Screening:  Abuse Screening Questionnaire 2020   Do you ever feel afraid of your partner? N   Are you in a relationship with someone who physically or mentally threatens you? N   Is it safe for you to go home? Y       Fall Risk  Fall Risk Assessment, last 12 mths 2020   Able to walk? Yes   Fall in past 12 months? No       Pt currently taking Anticoagulant therapy? no  Pt currently taking Antiplatelet therapy? no    Coordination of Care:  1. Have you been to the ER, urgent care clinic since your last visit? Hospitalized since your last visit? no    2.  Have you seen or consulted any other health care providers outside of the [address] [place] [address] since your last visit? Include any pap smears or colon screening. no      Please see Red banners under Allergies and Med Rec to remove outside inquires. All correct information has been verified with patient and added to chart.      Medication's patient's would liked removed has been marked not taking to be removed per Verbal order and read back per Rosemarie Guzman MD

## 2022-07-22 NOTE — PATIENT INSTRUCTIONS
New Medication/Medication Changes    Increase Toprol 50 mg daily    Decrease amlodipine 5 mg daily    **please allow 24-48 hrs for medication to be escribed to pharmacy** If you need any refills on medications please contact your pharmacy so that the request can be escribed to the provider for review.

## 2022-07-26 RX ORDER — METOPROLOL SUCCINATE 50 MG/1
50 TABLET, EXTENDED RELEASE ORAL DAILY
Qty: 90 TABLET | Refills: 3 | Status: SHIPPED | OUTPATIENT
Start: 2022-07-26

## 2022-07-26 RX ORDER — AMLODIPINE BESYLATE 5 MG/1
5 TABLET ORAL DAILY
Qty: 90 TABLET | Refills: 3 | Status: SHIPPED | OUTPATIENT
Start: 2022-07-26

## 2022-07-31 NOTE — PROGRESS NOTES
Subjective:      Bailey Person is in the office today for cardiac reevaluation. She is a 67-year-old woman that has a history of hypertension, palpitations and SVT. She was complaining of palpitations when she was initially seen in October of 2016. She was started on Toprol-XL 50 mg. She became much less symptomatic with the start of a betablocker. She also had some chest discomfort and a nuclear stress test was done that showed no evidence of ischemia. In May of 2017, she said she was experiencing increasing SOB. An echocardiogram was done. She had normal systolic function with no significant valvular pathology. She had no evidence of diastolic dysfunction. She also had no evidence of pulmonary hypertension. In the office today, she is having less problems with her sciatic nerve. She reports her breathing is \"a little worse in the heat \". She has had no PND or orthopnea. She has had no chest pain. She has had no palpitations, near-syncope or syncope. Patient Active Problem List    Diagnosis Date Noted    Severe obesity ([facility] 12/30/2018    FERNANDES (dyspnea on exertion) 05/22/2017    Palpitations 11/20/2016    BMI 35.0-35.9,adult 10/20/2015    Vitamin D deficiency 10/20/2015    Postoperative hypothyroidism 10/20/2015    PVCs (premature ventricular contractions)     Dyslipidemia     S/P thyroidectomy 03/27/2014    S/P parathyroidectomy ([facility] 03/27/2014    SVT (supraventricular tachycardia) ([facility] 03/27/2014    HTN (hypertension) 02/27/2014     Current Outpatient Medications   Medication Sig Dispense Refill    albuterol (PROVENTIL HFA, VENTOLIN HFA, PROAIR HFA) 90 mcg/actuation inhaler INHALE 1 TO 2 PUFFS BY MOUTH EVERY 4 HOURS AS NEEDED FOR WHEEZING      calcitRIOL (ROCALTROL) 0.5 mcg capsule Take 1 mcg by mouth two (2) times a day. levothyroxine (SYNTHROID) 150 mcg tablet Take  by mouth Daily (before breakfast). co-enzyme Q-10 (CO Q-10) 100 mg capsule Take 100 mg by mouth daily. atorvastatin (LIPITOR) 20 mg tablet Take  by mouth daily. multivitamin (ONE A DAY) tablet Take 1 tablet by mouth daily. metoprolol succinate (TOPROL-XL) 50 mg XL tablet Take 1 Tablet by mouth in the morning. 90 Tablet 3    amLODIPine (NORVASC) 5 mg tablet Take 1 Tablet by mouth in the morning. 90 Tablet 3    budesonide-formoteroL (SYMBICORT) 160-4.5 mcg/actuation HFAA Take 2 Puffs by inhalation two (2) times a day.  (Patient not taking: Reported on 2022)       Allergies   Allergen Reactions    Latex Unknown (comments)    Aspirin Unknown (comments)    Peanut Swelling     Past Medical History:   Diagnosis Date    Dry eye syndrome     Dyslipidemia     H/O hyperthyroidism     s/p thyroidectomy, Dr. Tracy Cortes is managing    History of nuclear stress test 14    EF 72-73%,low risk, no wall motion or valvular abnormalities, normal EKG    Hypertension     Menopause     PVCs (premature ventricular contractions)     2% of all beats, no VT (Holter 2014)    SVT (supraventricular tachycardia) ([facility])     Dr. Gladis Matthew, Cardiology     Past Surgical History:   Procedure Laterality Date    HX HYSTERECTOMY      HX MOHS PROCEDURES  2009    right     Richie Barnett      Dr. Rodrigo Zamora     Family History   Problem Relation Age of Onset    Hypertension Mother     Stroke Mother 72             Social History     Tobacco Use   Smoking Status Never   Smokeless Tobacco Never          Review of Systems, additional:  Constitutional: negative  Eyes: negative  Respiratory: negative  Cardiovascular: negative  Gastrointestinal: negative  Musculoskeletal:negative  Neurological: negative  Behvioral/Psych: negative  Endocrine: negative  ENT: negative    Objective:     Visit Vitals  /84   Pulse 69   Wt 103 kg (227 lb)   SpO2 97%   BMI 36.64 kg/m²     General:  alert, cooperative, no distress, appears stated age   Chest Wall: inspection normal - no chest wall deformities or tenderness, respiratory effort normal   Lung: clear to auscultation bilaterally   Heart:  normal rate, regular rhythm, normal S1, S2, no murmurs, rubs, clicks or gallops   Abdomen: soft, non-tender. Bowel sounds normal. No masses,  no organomegaly   Extremities: extremities normal, atraumatic, no cyanosis or edema Skin: no rashes   Neuro: alert, oriented, normal speech, no focal findings or movement disorder noted   7/22/2022. Sinus rhythm. PVCs. Otherwise normal tracing. Assessment/Plan:       ICD-10-CM ICD-9-CM    1. Essential hypertension, controlled blood pressure in the office today I10 401.9    2. PVCs (premature ventricular contractions), will reduce amlodipine to 5 mg daily. Will  increase metoprolol succinate to 50 mg daily. Return in 3 months. I49.3 427.69    3. SVT (supraventricular tachycardia) (HCC) I47.1 427.89    4. FERNANDES (dyspnea on exertion), felt to be most likely likely related  to cardiac deconditioning in the past.  Stable. R06.09 786.09    5.  Palpitations, occasional.  Stable R00.2 785.1

## 2022-10-21 ENCOUNTER — OFFICE VISIT (OUTPATIENT)
Dept: CARDIOLOGY CLINIC | Age: 62
End: 2022-10-21
Payer: COMMERCIAL

## 2022-10-21 VITALS
HEART RATE: 74 BPM | OXYGEN SATURATION: 98 % | SYSTOLIC BLOOD PRESSURE: 136 MMHG | WEIGHT: 229.8 LBS | TEMPERATURE: 97.8 F | BODY MASS INDEX: 37.09 KG/M2 | DIASTOLIC BLOOD PRESSURE: 82 MMHG

## 2022-10-21 DIAGNOSIS — I49.3 PVCS (PREMATURE VENTRICULAR CONTRACTIONS): Primary | ICD-10-CM

## 2022-10-21 PROCEDURE — 99214 OFFICE O/P EST MOD 30 MIN: CPT | Performed by: INTERNAL MEDICINE

## 2022-10-21 PROCEDURE — 3074F SYST BP LT 130 MM HG: CPT | Performed by: INTERNAL MEDICINE

## 2022-10-21 PROCEDURE — 3078F DIAST BP <80 MM HG: CPT | Performed by: INTERNAL MEDICINE

## 2022-10-21 NOTE — PROGRESS NOTES
Identified pt with two pt identifiers(name and ). Reviewed record in preparation for visit and have obtained necessary documentation. Esther Souza presents today for   Chief Complaint   Patient presents with    Follow-up     4 month        Pt denies DIZZINESS, SOB, CHEST PAIN/ PRESSURE, FATIGUE/WEAKNESS, HEADACHES, SWELLING. Esther Souza preferred language for health care discussion is english/other. Personal Protective Equipment:   Personal Protective Equipment was used including: mask-surgical and hands-gloves. Patient was placed on no precaution(s). Patient was masked. Precautions:   Patient currently on None  Patient currently roomed with door closed. Is someone accompanying this pt? no    Is the patient using any DME equipment during [address]? No     Depression Screening:  3 most recent PHQ Screens 10/21/2022   Little interest or pleasure in doing things Not at all   Feeling down, depressed, irritable, or hopeless Not at all   Total Score PHQ 2 0       Learning Assessment:  Learning Assessment 2014   PRIMARY LEARNER Patient   HIGHEST LEVEL OF EDUCATION - PRIMARY LEARNER  GRADUATED HIGH SCHOOL OR GED   BARRIERS PRIMARY LEARNER NONE   PRIMARY LANGUAGE ENGLISH    NEED No   LEARNER PREFERENCE PRIMARY READING   LEARNING SPECIAL TOPICS none   ANSWERED BY patient   RELATIONSHIP SELF       Abuse Screening:  Abuse Screening Questionnaire 2020   Do you ever feel afraid of your partner? N   Are you in a relationship with someone who physically or mentally threatens you? N   Is it safe for you to go home? Y       Fall Risk  Fall Risk Assessment, last 12 mths 2020   Able to walk? Yes   Fall in past 12 months? No       Pt currently taking Anticoagulant therapy? No   Pt currently taking Antiplatelet therapy? No     Coordination of Care:  1. Have you been to the ER, urgent care clinic since your last visit? Hospitalized since your last visit? No     2.  Have you seen or consulted any other health care providers outside of the [address] [place] [address] since your last visit? Include any pap smears or colon screening. Yes       Please see Red banners under Allergies and Med Rec to remove outside inquires. All correct information has been verified with patient and added to chart.      Medication's patient's would liked removed has been marked not taking to be removed per Verbal order and read back per Magdalena Moroe MD

## 2022-10-26 NOTE — PROGRESS NOTES
Subjective:      Emeli Horowitz is in the office today for cardiac reevaluation. She is a 68-year-old woman that has a history of hypertension, palpitations and SVT. She was complaining of palpitations when she was initially seen in October of 2016. She was started on Toprol-XL 50 mg. She became much less symptomatic with the start of a betablocker. She also had chest discomfort and a nuclear stress test was done that showed no evidence of ischemia. In May of 2017, she said she was experiencing increasing SOB. An echocardiogram was done. She had normal systolic function with no significant valvular pathology. She had no evidence of diastolic dysfunction. She also had no evidence of pulmonary hypertension. She has  At her last visit, metoprolol was increased and the amlodipine was decreased. In the office today, she reports she is having much less palpitations. She has had no chest pain or shortness of breath. Patient Active Problem List    Diagnosis Date Noted    Severe obesity ([facility] 12/30/2018    FERNANDES (dyspnea on exertion) 05/22/2017    Palpitations 11/20/2016    BMI 35.0-35.9,adult 10/20/2015    Vitamin D deficiency 10/20/2015    Postoperative hypothyroidism 10/20/2015    PVCs (premature ventricular contractions)     Dyslipidemia     S/P thyroidectomy 03/27/2014    S/P parathyroidectomy ([facility] 03/27/2014    SVT (supraventricular tachycardia) ([facility] 03/27/2014    HTN (hypertension) 02/27/2014     Current Outpatient Medications   Medication Sig Dispense Refill    metoprolol succinate (TOPROL-XL) 50 mg XL tablet Take 1 Tablet by mouth in the morning. 90 Tablet 3    amLODIPine (NORVASC) 5 mg tablet Take 1 Tablet by mouth in the morning. 90 Tablet 3    budesonide-formoteroL (SYMBICORT) 160-4.5 mcg/actuation HFAA Take 2 Puffs by inhalation two (2) times a day. calcitRIOL (ROCALTROL) 0.5 mcg capsule Take 1 mcg by mouth two (2) times a day.       levothyroxine (SYNTHROID) 125 mcg tablet Take 125 mcg by mouth Daily (before breakfast). co-enzyme Q-10 (CO Q-10) 100 mg capsule Take 100 mg by mouth daily. atorvastatin (LIPITOR) 20 mg tablet Take  by mouth daily. multivitamin (ONE A DAY) tablet Take 1 tablet by mouth daily. Allergies   Allergen Reactions    Latex Unknown (comments)    Aspirin Unknown (comments)    Peanut Swelling     Past Medical History:   Diagnosis Date    Dry eye syndrome     Dyslipidemia     H/O hyperthyroidism     s/p thyroidectomy, Dr. Denver Duron is managing    History of nuclear stress test 14    EF 72-73%,low risk, no wall motion or valvular abnormalities, normal EKG    Hypertension     Menopause     PVCs (premature ventricular contractions)     2% of all beats, no VT (Holter 2014)    SVT (supraventricular tachycardia) ([facility])     Dr. Nemo Quinones, Cardiology     Past Surgical History:   Procedure Laterality Date    HX HYSTERECTOMY      HX MOHS PROCEDURES  2009    right     Geena Plunk      Dr. Em Ramos     Family History   Problem Relation Age of Onset    Hypertension Mother     Stroke Mother 72             Social History     Tobacco Use   Smoking Status Never   Smokeless Tobacco Never          Review of Systems, additional:  Constitutional: negative  Eyes: negative  Respiratory: negative  Cardiovascular: negative  Gastrointestinal: negative  Musculoskeletal:negative  Neurological: negative  Behvioral/Psych: negative  Endocrine: negative  ENT: negative    Objective:     Visit Vitals  /82   Pulse 74   Temp 97.8 °F (36.6 °C) (Temporal)   Wt 104.2 kg (229 lb 12.8 oz)   SpO2 98%   BMI 37.09 kg/m²     General:  alert, cooperative, no distress, appears stated age   Chest Wall: inspection normal - no chest wall deformities or tenderness, respiratory effort normal   Lung: clear to auscultation bilaterally   Heart:  normal rate, regular rhythm, normal S1, S2, no murmurs, rubs, clicks or gallops   Abdomen: soft, non-tender.  Bowel sounds normal. No masses,  no organomegaly   Extremities: extremities normal, atraumatic, no cyanosis or edema Skin: no rashes   Neuro: alert, oriented, normal speech, no focal findings or movement disorder noted   7/22/2022. Sinus rhythm. PVCs. Otherwise normal tracing. Assessment/Plan:       ICD-10-CM ICD-9-CM    1. Essential hypertension, controlled blood pressure in the office today I10 401.9    2. PVCs (premature ventricular contractions), will reduce amlodipine to 5 mg daily. Will  increase metoprolol succinate to 50 mg daily. Palpitations improved. Heart in 7 months I49.3 427.69    3. SVT (supraventricular tachycardia) (HCC) I47.1 427.89    4. FERNANDES (dyspnea on exertion), felt to be most likely likely related  to cardiac deconditioning in the past.    R06.09 786.09    5.  Palpitations, improved with increase in beta-blocker R00.2 785.1

## 2023-05-26 ENCOUNTER — OFFICE VISIT (OUTPATIENT)
Age: 63
End: 2023-05-26

## 2023-05-26 VITALS
DIASTOLIC BLOOD PRESSURE: 80 MMHG | OXYGEN SATURATION: 98 % | WEIGHT: 219 LBS | HEART RATE: 78 BPM | SYSTOLIC BLOOD PRESSURE: 134 MMHG

## 2023-05-26 DIAGNOSIS — I47.1 SUPRAVENTRICULAR TACHYCARDIA (HCC): ICD-10-CM

## 2023-05-26 DIAGNOSIS — I47.1 SUPRAVENTRICULAR TACHYCARDIA (HCC): Primary | ICD-10-CM

## 2023-05-26 ASSESSMENT — PATIENT HEALTH QUESTIONNAIRE - PHQ9
1. LITTLE INTEREST OR PLEASURE IN DOING THINGS: 0
SUM OF ALL RESPONSES TO PHQ QUESTIONS 1-9: 0
SUM OF ALL RESPONSES TO PHQ QUESTIONS 1-9: 0
2. FEELING DOWN, DEPRESSED OR HOPELESS: 0
SUM OF ALL RESPONSES TO PHQ9 QUESTIONS 1 & 2: 0
SUM OF ALL RESPONSES TO PHQ QUESTIONS 1-9: 0
SUM OF ALL RESPONSES TO PHQ QUESTIONS 1-9: 0

## 2023-06-03 NOTE — PROGRESS NOTES
Subjective:      Emilia Weber is in the office today for cardiac reevaluation. She is a 40-year-old woman that has a history of hypertension, palpitations and SVT. She was complaining of palpitations when she was initially seen in October of 2016. She was started on Toprol-XL 50 mg. She became much less symptomatic with the start of a betablocker. She also had chest discomfort and a nuclear stress test was done that showed no evidence of ischemia. In May of 2017, she said she was experiencing increasing SOB. An echocardiogram was done. She had normal systolic function with no significant valvular pathology. She had no evidence of diastolic dysfunction. She also had no evidence of pulmonary hypertension. At a prior visit, metoprolol was increased and the amlodipine was decreased. In the office today, she reports she is having much less palpitations. She has had no chest pain or shortness of breath. She is feeling \"pretty good\". She has been trying to exercise. Her thyroid dosage was decreased. She has been unable to tolerate statins in the past.      Patient Active Problem List    Diagnosis Date Noted    Severe obesity ([facility] 12/30/2018    SHRESTHA (dyspnea on exertion) 05/22/2017    Palpitations 11/20/2016    BMI 35.0-35.9,adult 10/20/2015    Vitamin D deficiency 10/20/2015    Postoperative hypothyroidism 10/20/2015    PVCs (premature ventricular contractions)     Dyslipidemia     S/P thyroidectomy 03/27/2014    S/P parathyroidectomy ([facility] 03/27/2014    SVT (supraventricular tachycardia) ([facility] 03/27/2014    HTN (hypertension) 02/27/2014     Current Outpatient Medications   Medication Sig Dispense Refill    metoprolol succinate (TOPROL-XL) 50 mg XL tablet Take 1 Tablet by mouth in the morning. 90 Tablet 3    amLODIPine (NORVASC) 5 mg tablet Take 1 Tablet by mouth in the morning.  90 Tablet 3    budesonide-formoteroL (SYMBICORT) 160-4.5 mcg/actuation HFAA Take 2 Puffs by inhalation two (2)

## 2023-06-07 ENCOUNTER — TELEPHONE (OUTPATIENT)
Age: 63
End: 2023-06-07

## 2023-06-28 RX ORDER — METOPROLOL SUCCINATE 50 MG/1
50 TABLET, EXTENDED RELEASE ORAL DAILY
Qty: 90 TABLET | Refills: 3 | Status: SHIPPED | OUTPATIENT
Start: 2023-06-28

## 2023-08-02 RX ORDER — ATORVASTATIN CALCIUM 20 MG/1
20 TABLET, FILM COATED ORAL DAILY
Qty: 90 TABLET | Refills: 3 | Status: SHIPPED | OUTPATIENT
Start: 2023-08-02

## 2023-08-16 ENCOUNTER — TELEPHONE (OUTPATIENT)
Age: 63
End: 2023-08-16

## 2023-08-16 RX ORDER — EZETIMIBE 10 MG/1
10 TABLET ORAL DAILY
COMMUNITY
End: 2023-08-17 | Stop reason: SDUPTHER

## 2023-08-17 RX ORDER — EZETIMIBE 10 MG/1
10 TABLET ORAL DAILY
Qty: 30 TABLET | Refills: 6 | Status: SHIPPED | OUTPATIENT
Start: 2023-08-17

## 2024-11-08 ENCOUNTER — OFFICE VISIT (OUTPATIENT)
Age: 64
End: 2024-11-08

## 2024-11-08 VITALS
OXYGEN SATURATION: 100 % | SYSTOLIC BLOOD PRESSURE: 138 MMHG | DIASTOLIC BLOOD PRESSURE: 87 MMHG | HEART RATE: 79 BPM | BODY MASS INDEX: 35.68 KG/M2 | HEIGHT: 66 IN | WEIGHT: 222 LBS

## 2024-11-08 DIAGNOSIS — I10 PRIMARY HYPERTENSION: ICD-10-CM

## 2024-11-08 DIAGNOSIS — I47.10 SUPRAVENTRICULAR TACHYCARDIA (HCC): Primary | ICD-10-CM

## 2024-11-08 NOTE — PROGRESS NOTES
Identified pt with two pt identifiers(name and ). Reviewed record in preparation for visit and have obtained necessary documentation.    Zoya Shields presents today for   Chief Complaint   Patient presents with    Follow-up     1 year follow up         Pt denied DIZZINESS, SOB, CHEST PAIN/ PRESSURE, FATIGUE/WEAKNESS, HEADACHES, SWELLING.             Zoya Shields preferred language for health care discussion is english/other.    Personal Protective Equipment:   Personal Protective Equipment was used including: mask-surgical and hands-gloves. Patient was placed on no precaution(s). Patient was not masked.    Precautions:   Patient currently on None  Patient currently roomed with door closed.    Is someone accompanying this pt? no    Is the patient using any DME equipment during OV? no    Depression Screenin/26/2023     9:41 AM 10/21/2022     9:40 AM 10/21/2022     9:28 AM 2022     9:13 AM   PHQ-9 Questionaire   Little interest or pleasure in doing things 0 0 0 0   Feeling down, depressed, or hopeless 0 0 0 0   PHQ-9 Total Score 0 0 0 0        Learning Assessment:  No question data found.    Abuse Screenin/8/2024     1:00 PM 2023     9:00 AM   AMB Abuse Screening   Do you ever feel afraid of your partner? N N   Are you in a relationship with someone who physically or mentally threatens you? N N   Is it safe for you to go home? Y Y          Fall Risk      2024     1:24 PM 2023     9:41 AM   Fall Risk   Do you feel unsteady or are you worried about falling?  no no   2 or more falls in past year? no no   Fall with injury in past year? no no         Pt currently taking Anticoagulant /Antiplatelet therapy? no    Coordination of Care:  1. Have you been to the ER, urgent care clinic since your last visit? Hospitalized since your last visit? no    2. Have you seen or consulted any other health care providers outside of the [facility] since your last visit? Include

## 2024-11-15 NOTE — PROGRESS NOTES
Subjective:      Zoya Shields is in the office today for cardiac reevaluation.      She is a 64-year-old woman that has a history of hypertension, palpitations and SVT.  She was complaining of palpitations when she was initially seen in October of 2016.  She was started on Toprol-XL 50 mg. She seemed to become much less symptomatic with the start of a betablocker.      She also had chest discomfort and a nuclear stress test was done that showed no evidence of ischemia.      In May of 2017, she said she was experiencing increasing SOB.   An echocardiogram was done. She had normal systolic function with no significant valvular pathology.  She had no evidence of diastolic dysfunction.  She also had no evidence of pulmonary hypertension.        At some point the beta-blocker was discontinued.  However, the patient has been stable and regard to her palpitations.  In the office today, she reports she is having much less palpitations.  She has had no chest pain or shortness of breath.  She reports she is feeling \"great \".  She goes to the gym \"every single day \".        Patient Active Problem List    Diagnosis Date Noted    Severe obesity ([facility]) 12/30/2018    SHRESTHA (dyspnea on exertion) 05/22/2017    Palpitations 11/20/2016    BMI 35.0-35.9,adult 10/20/2015    Vitamin D deficiency 10/20/2015    Postoperative hypothyroidism 10/20/2015    PVCs (premature ventricular contractions)     Dyslipidemia     S/P thyroidectomy 03/27/2014    S/P parathyroidectomy ([facility]) 03/27/2014    SVT (supraventricular tachycardia) ([facility]) 03/27/2014    HTN (hypertension) 02/27/2014     Current Outpatient Medications   Medication Sig Dispense Refill    metoprolol succinate (TOPROL-XL) 50 mg XL tablet Take 1 Tablet by mouth in the morning. 90 Tablet 3    amLODIPine (NORVASC) 5 mg tablet Take 1 Tablet by mouth in the morning. 90 Tablet 3    budesonide-formoteroL (SYMBICORT) 160-4.5 mcg/actuation HFAA Take 2 Puffs by inhalation two (2) times a day.